# Patient Record
Sex: FEMALE | Race: WHITE | NOT HISPANIC OR LATINO | Employment: FULL TIME | URBAN - METROPOLITAN AREA
[De-identification: names, ages, dates, MRNs, and addresses within clinical notes are randomized per-mention and may not be internally consistent; named-entity substitution may affect disease eponyms.]

---

## 2017-02-26 ENCOUNTER — COMMUNICATION - HEALTHEAST (OUTPATIENT)
Dept: FAMILY MEDICINE | Facility: CLINIC | Age: 22
End: 2017-02-26

## 2017-02-26 DIAGNOSIS — Z30.09 BIRTH CONTROL COUNSELING: ICD-10-CM

## 2017-02-28 ENCOUNTER — OFFICE VISIT - HEALTHEAST (OUTPATIENT)
Dept: FAMILY MEDICINE | Facility: CLINIC | Age: 22
End: 2017-02-28

## 2017-02-28 DIAGNOSIS — Z30.09 BIRTH CONTROL COUNSELING: ICD-10-CM

## 2017-02-28 DIAGNOSIS — R10.33 PERIUMBILICAL PAIN: ICD-10-CM

## 2017-05-16 ENCOUNTER — AMBULATORY - HEALTHEAST (OUTPATIENT)
Dept: NURSING | Facility: CLINIC | Age: 22
End: 2017-05-16

## 2017-05-16 DIAGNOSIS — Z23 NEED FOR HEPATITIS B VACCINATION: ICD-10-CM

## 2017-05-19 ENCOUNTER — RECORDS - HEALTHEAST (OUTPATIENT)
Dept: ADMINISTRATIVE | Facility: OTHER | Age: 22
End: 2017-05-19

## 2017-10-02 ENCOUNTER — COMMUNICATION - HEALTHEAST (OUTPATIENT)
Dept: FAMILY MEDICINE | Facility: CLINIC | Age: 22
End: 2017-10-02

## 2017-11-17 ENCOUNTER — COMMUNICATION - HEALTHEAST (OUTPATIENT)
Dept: FAMILY MEDICINE | Facility: CLINIC | Age: 22
End: 2017-11-17

## 2017-11-17 DIAGNOSIS — Z30.09 BIRTH CONTROL COUNSELING: ICD-10-CM

## 2018-01-03 ENCOUNTER — OFFICE VISIT - HEALTHEAST (OUTPATIENT)
Dept: FAMILY MEDICINE | Facility: CLINIC | Age: 23
End: 2018-01-03

## 2018-01-03 DIAGNOSIS — B07.9 WARTS OF FOOT: ICD-10-CM

## 2018-01-03 DIAGNOSIS — Z30.9 CONTRACEPTION MANAGEMENT: ICD-10-CM

## 2018-07-03 ENCOUNTER — RECORDS - HEALTHEAST (OUTPATIENT)
Dept: ADMINISTRATIVE | Facility: OTHER | Age: 23
End: 2018-07-03

## 2018-11-22 ENCOUNTER — COMMUNICATION - HEALTHEAST (OUTPATIENT)
Dept: FAMILY MEDICINE | Facility: CLINIC | Age: 23
End: 2018-11-22

## 2018-11-22 DIAGNOSIS — Z30.9 CONTRACEPTION MANAGEMENT: ICD-10-CM

## 2019-02-08 ENCOUNTER — COMMUNICATION - HEALTHEAST (OUTPATIENT)
Dept: FAMILY MEDICINE | Facility: CLINIC | Age: 24
End: 2019-02-08

## 2019-02-08 DIAGNOSIS — Z30.9 CONTRACEPTION MANAGEMENT: ICD-10-CM

## 2019-02-25 ENCOUNTER — OFFICE VISIT - HEALTHEAST (OUTPATIENT)
Dept: FAMILY MEDICINE | Facility: CLINIC | Age: 24
End: 2019-02-25

## 2019-02-25 DIAGNOSIS — Z23 IMMUNIZATION DUE: ICD-10-CM

## 2019-02-25 DIAGNOSIS — Z30.9 CONTRACEPTION MANAGEMENT: ICD-10-CM

## 2019-03-26 ENCOUNTER — OFFICE VISIT - HEALTHEAST (OUTPATIENT)
Dept: FAMILY MEDICINE | Facility: CLINIC | Age: 24
End: 2019-03-26

## 2019-03-26 DIAGNOSIS — R68.82 DECREASED LIBIDO: ICD-10-CM

## 2019-03-26 DIAGNOSIS — Z30.40 ENCOUNTER FOR SURVEILLANCE OF CONTRACEPTIVES, UNSPECIFIED CONTRACEPTIVE: ICD-10-CM

## 2019-03-26 DIAGNOSIS — Z23 IMMUNIZATION DUE: ICD-10-CM

## 2019-03-26 DIAGNOSIS — Z12.4 CERVICAL CANCER SCREENING: ICD-10-CM

## 2019-03-26 DIAGNOSIS — Z00.00 ROUTINE GENERAL MEDICAL EXAMINATION AT A HEALTH CARE FACILITY: ICD-10-CM

## 2019-03-26 ASSESSMENT — MIFFLIN-ST. JEOR: SCORE: 1492.02

## 2019-03-27 LAB
BKR LAB AP ABNORMAL BLEEDING: NO
BKR LAB AP BIRTH CONTROL/HORMONES: NORMAL
BKR LAB AP CERVICAL APPEARANCE: NORMAL
BKR LAB AP GYN ADEQUACY: NORMAL
BKR LAB AP GYN INTERPRETATION: NORMAL
BKR LAB AP HPV REFLEX: NORMAL
BKR LAB AP LMP: NORMAL
BKR LAB AP PATIENT STATUS: NORMAL
BKR LAB AP PREVIOUS ABNORMAL: NORMAL
BKR LAB AP PREVIOUS NORMAL: 2015
HIGH RISK?: NO
PATH REPORT.COMMENTS IMP SPEC: NORMAL
RESULT FLAG (HE HISTORICAL CONVERSION): NORMAL

## 2019-05-28 ENCOUNTER — AMBULATORY - HEALTHEAST (OUTPATIENT)
Dept: NURSING | Facility: CLINIC | Age: 24
End: 2019-05-28

## 2019-05-28 DIAGNOSIS — Z23 IMMUNIZATION DUE: ICD-10-CM

## 2019-09-24 ENCOUNTER — COMMUNICATION - HEALTHEAST (OUTPATIENT)
Dept: FAMILY MEDICINE | Facility: CLINIC | Age: 24
End: 2019-09-24

## 2019-09-24 DIAGNOSIS — Z30.09 COUNSELING FOR BIRTH CONTROL, INTRAUTERINE DEVICE: ICD-10-CM

## 2019-10-01 ENCOUNTER — AMBULATORY - HEALTHEAST (OUTPATIENT)
Dept: NURSING | Facility: CLINIC | Age: 24
End: 2019-10-01

## 2019-10-01 DIAGNOSIS — Z23 IMMUNIZATION DUE: ICD-10-CM

## 2019-10-18 ENCOUNTER — OFFICE VISIT - HEALTHEAST (OUTPATIENT)
Dept: FAMILY MEDICINE | Facility: CLINIC | Age: 24
End: 2019-10-18

## 2019-10-18 DIAGNOSIS — Z30.430 ENCOUNTER FOR IUD INSERTION: ICD-10-CM

## 2019-10-18 LAB — HCG UR QL: NEGATIVE

## 2021-04-16 ENCOUNTER — OFFICE VISIT - HEALTHEAST (OUTPATIENT)
Dept: FAMILY MEDICINE | Facility: CLINIC | Age: 26
End: 2021-04-16

## 2021-04-16 ENCOUNTER — COMMUNICATION - HEALTHEAST (OUTPATIENT)
Dept: FAMILY MEDICINE | Facility: CLINIC | Age: 26
End: 2021-04-16

## 2021-04-16 DIAGNOSIS — Z30.432 ENCOUNTER FOR IUD REMOVAL: ICD-10-CM

## 2021-05-19 ENCOUNTER — OFFICE VISIT - HEALTHEAST (OUTPATIENT)
Dept: FAMILY MEDICINE | Facility: CLINIC | Age: 26
End: 2021-05-19

## 2021-05-19 DIAGNOSIS — Z34.01 NORMAL FIRST PREGNANCY CONFIRMED, CURRENTLY IN FIRST TRIMESTER: ICD-10-CM

## 2021-05-19 LAB — HCG UR QL: POSITIVE

## 2021-05-19 RX ORDER — PRENATAL VIT/IRON FUM/FOLIC AC 27MG-0.8MG
1 TABLET ORAL DAILY
Qty: 90 TABLET | Refills: 3 | Status: SHIPPED
Start: 2021-05-19

## 2021-05-19 ASSESSMENT — MIFFLIN-ST. JEOR: SCORE: 1486.13

## 2021-06-08 ENCOUNTER — HOSPITAL ENCOUNTER (OUTPATIENT)
Dept: ULTRASOUND IMAGING | Facility: HOSPITAL | Age: 26
Discharge: HOME | End: 2021-06-08
Attending: FAMILY MEDICINE

## 2021-06-13 ENCOUNTER — HEALTH MAINTENANCE LETTER (OUTPATIENT)
Age: 26
End: 2021-06-13

## 2021-07-01 ENCOUNTER — COMMUNICATION - HEALTHEAST (OUTPATIENT)
Dept: SCHEDULING | Facility: CLINIC | Age: 26
End: 2021-07-01

## 2021-07-02 ENCOUNTER — OFFICE VISIT - HEALTHEAST (OUTPATIENT)
Dept: FAMILY MEDICINE | Facility: CLINIC | Age: 26
End: 2021-07-02

## 2021-07-02 ENCOUNTER — HOSPITAL ENCOUNTER (OUTPATIENT)
Dept: ULTRASOUND IMAGING | Facility: CLINIC | Age: 26
Discharge: HOME | End: 2021-07-02
Attending: FAMILY MEDICINE
Payer: COMMERCIAL

## 2021-07-02 DIAGNOSIS — Z3A.10 10 WEEKS GESTATION OF PREGNANCY: ICD-10-CM

## 2021-07-02 DIAGNOSIS — O20.0 THREATENED ABORTION IN FIRST TRIMESTER: ICD-10-CM

## 2021-07-02 LAB — HGB BLD-MCNC: 14 G/DL (ref 12–16)

## 2021-07-02 ASSESSMENT — MIFFLIN-ST. JEOR: SCORE: 1504.72

## 2021-07-03 LAB
ABO/RH(D): NORMAL
ABORH REPEAT: NORMAL

## 2021-07-05 ENCOUNTER — DOCUMENTATION ONLY (OUTPATIENT)
Dept: ADMINISTRATIVE | Facility: OTHER | Age: 26
End: 2021-07-05

## 2021-07-05 VITALS — HEIGHT: 69 IN

## 2021-07-06 ENCOUNTER — TRANSFERRED RECORDS (OUTPATIENT)
Dept: HEALTH INFORMATION MANAGEMENT | Facility: CLINIC | Age: 26
End: 2021-07-06

## 2021-07-06 VITALS
HEART RATE: 68 BPM | BODY MASS INDEX: 23.03 KG/M2 | SYSTOLIC BLOOD PRESSURE: 135 MMHG | DIASTOLIC BLOOD PRESSURE: 64 MMHG | WEIGHT: 155.5 LBS | HEIGHT: 69 IN

## 2021-07-09 NOTE — PROGRESS NOTES
This encounter was created as part of manual pregnancy episode data conversion for the single EHR project. The following information (where applicable) was manually abstracted from the [employer] Epic instance on July 9, 2021: pregnancy episode name/date, dating, episode encounter linking, pregravid weight, number of fetuses, and pregnancy overview and plan.     Haleigh Tucker RN   Clinical Informatics

## 2021-07-14 ENCOUNTER — MYC MEDICAL ADVICE (OUTPATIENT)
Dept: FAMILY MEDICINE | Facility: CLINIC | Age: 26
End: 2021-07-14

## 2021-07-15 ENCOUNTER — PRENATAL OFFICE VISIT (OUTPATIENT)
Dept: FAMILY MEDICINE | Facility: CLINIC | Age: 26
End: 2021-07-15
Payer: COMMERCIAL

## 2021-07-15 VITALS
OXYGEN SATURATION: 100 % | HEIGHT: 69 IN | SYSTOLIC BLOOD PRESSURE: 137 MMHG | BODY MASS INDEX: 22.42 KG/M2 | DIASTOLIC BLOOD PRESSURE: 74 MMHG | HEART RATE: 80 BPM | WEIGHT: 151.4 LBS

## 2021-07-15 VITALS
BODY MASS INDEX: 22.61 KG/M2 | SYSTOLIC BLOOD PRESSURE: 110 MMHG | HEART RATE: 83 BPM | DIASTOLIC BLOOD PRESSURE: 70 MMHG | WEIGHT: 152.25 LBS | RESPIRATION RATE: 16 BRPM | OXYGEN SATURATION: 98 %

## 2021-07-15 VITALS
HEART RATE: 75 BPM | BODY MASS INDEX: 22.96 KG/M2 | SYSTOLIC BLOOD PRESSURE: 113 MMHG | TEMPERATURE: 98 F | WEIGHT: 155 LBS | HEIGHT: 69 IN | DIASTOLIC BLOOD PRESSURE: 58 MMHG

## 2021-07-15 VITALS
SYSTOLIC BLOOD PRESSURE: 124 MMHG | DIASTOLIC BLOOD PRESSURE: 58 MMHG | BODY MASS INDEX: 22.13 KG/M2 | OXYGEN SATURATION: 99 % | WEIGHT: 149 LBS | HEART RATE: 78 BPM

## 2021-07-15 VITALS — WEIGHT: 157.4 LBS | HEIGHT: 69 IN | BODY MASS INDEX: 22.72 KG/M2 | BODY MASS INDEX: 23.41 KG/M2 | WEIGHT: 153.4 LBS

## 2021-07-15 VITALS — BODY MASS INDEX: 22.85 KG/M2 | WEIGHT: 153.6 LBS

## 2021-07-15 VITALS — BODY MASS INDEX: 22.61 KG/M2 | WEIGHT: 152 LBS

## 2021-07-15 DIAGNOSIS — O20.9 VAGINAL BLEEDING IN PREGNANCY, FIRST TRIMESTER: Primary | ICD-10-CM

## 2021-07-15 LAB
CLUE CELLS: ABNORMAL
TRICHOMONAS, WET PREP: ABNORMAL
WBC'S/HIGH POWER FIELD, WET PREP: ABNORMAL
YEAST, WET PREP: ABNORMAL

## 2021-07-15 PROCEDURE — 87210 SMEAR WET MOUNT SALINE/INK: CPT | Performed by: FAMILY MEDICINE

## 2021-07-15 PROCEDURE — 99207 PR PRENATAL VISIT: CPT | Performed by: FAMILY MEDICINE

## 2021-07-15 ASSESSMENT — MIFFLIN-ST. JEOR: SCORE: 1502.46

## 2021-07-15 NOTE — PATIENT INSTRUCTIONS - HE
Phone Numbers:  [facility]: [phone]  [facility]: [phone]    Tips to Relieve Nausea  Although nausea can occur at any time of the day, it may be worse in the morning. To help prevent nausea:    Eat small, light meals at frequent intervals.    Get up slowly. Eat a few unsalted crackers before you get out of bed.    Drink water or 7-Up to soothe your stomach.    Eat an ice pop in your favorite flavor.    Ask your health care provider about taking lolita or vitamin B6 for nausea and vomiting.    Talk with your health care provider if you take vitamins that upset your stomach.  Safe Medications    Take one prenatal vitamin with at least 400 mcg of folic acid daily.    Use acetaminophen (Tylenol) for pain.     Try Maalox or Tums for heartburn. If these are not working, talk to your doctor about trying a different medication.    Diphenhydramine (Benadryl) can also be used safely in pregnancy.    Talk to your doctor about any other medications or vitamins you are taking!  Start Healthy Habits Now  What matters most is protecting your baby from this moment on. If you smoke, drink alcohol, or use drugs, now is the time to stop. If you need help, talk with your health care provider.    Smoking increases the risk of miscarriage or having a low-birth-weight baby. If you smoke, quit now.    Alcohol and drugs have been linked with miscarriage, birth defects, intellectual disability, and low birth weight. Do not drink alcohol or take drugs.    Continue your current exercise routine, or start one with walking, swimming, and other low impact exercises. Yoga specifically designed for pregnant moms is a great stress and pain reliever. Keep your self well hydrated and avoid overheating with all activity. If bleeding, fluid leakage, or cramping/contractions occur, stop the exercise and call your doctor.     Wear your seatbelt every time you are in the car. Fasten the lap part underneath your growing belly and the shoulder  part as you normally would.   Weight Gain    Add an additional 300 to 400 calories a day into your diet.    Ideal weight gain is 25 to 35 pounds.    If your BMI is over 30, your ideal weight gain is 15 pounds.    If your BMI is under 20, your ideal weight gain is 40 pounds.    Talk to your doctor if you are concerned about weight gain.   Keep Your Environment Save  You can still clean house and use scented products. Just take some simple precautions:    Wear gloves when using cleaning fluids.    Open windows to let in fresh air. Use a fan if you paint.    Avoid secondhand smoke.    Don t breathe fumes from nail polish, hair spray, cleansers, or other chemicals.  Work Concerns  The end of the first trimester is a good time to discuss working during pregnancy with your employer. Follow your health care provider s advice if your job requires you to stand for a long time, work with hazardous tools, or even sit at a desk all day. Your workspace, workload, or scheduled hours may need to be adjusted - perhaps you can change body postures more often or take an extra break.  Intimacy  Unless your health care provider tells you to, there is no reason to stop having sex while you re pregnant. You or your partner may notice changes in desire. Desire may be less in the first trimester, due to nausea and fatigue. In the second trimester, sex may be very enjoyable. The third trimester can be a challenge comfort-wise. Try different positions and see what s best for you both. As always, let your doctor know if you experience any bleeding, leakage of fluids, or cramping/contractions.  Other Pregnancy Concerns    Limit the amount of radiation you are exposed to. Always tell the radiology technologist that you are pregnant if having an xray or CT scan done.     Practice good hand washing to prevent infection.    Avoid cat litter.     Wash all fruits and vegetabes. Always cook meat thoroughly and do not eat raw fish. Do not eat more  than 6 to 12 ounces of other fish sources.     Do not eat soft cheeses.    Limit caffeine to less than 300 milligrams a days. The average cup of coffee as approximately 120 milligrams of caffeine.

## 2021-07-15 NOTE — TELEPHONE ENCOUNTER
Left message to call back for: patient  Information to relay to patient:  See note below.   H.DeGree, CMA

## 2021-07-15 NOTE — PROGRESS NOTES
Assessment:         1. Routine general medical examination at a health care facility    2. Decreased libido    3. Encounter for surveillance of contraceptives, unspecified contraceptive    4. Cervical cancer screening    5. Immunization due        Plan:      1. Routine general medical examination at a health care facility  Routine health maintenance discussion:  No smoking, limited alcohol (7 or less servings per week), 5 fruits/veg servings per day, 200 minutes of exercise per week.  Daily calcium/vitamin D guidelines, bone health, colon cancer screening beginning at age 50.  Accident avoidance, sun screen.     2. Decreased libido  -Discussed the birth control as a possible etiology of her decreased libido.  Recommended considering couples therapy as well.  In the meantime I am going to change her to a lower anti-androgen progesterone to see if this helps.  She will let me know in the next 2-3 months how she is doing, sooner if she is not doing well.    3. Encounter for surveillance of contraceptives, unspecified contraceptive  -As stated above, will trial Kariva instead  - desog-e.estradiol/e.estradiol (KARIVA) 0.15-0.02 mgx21 /0.01 mg x 5 per tablet; Take 1 tablet by mouth daily.  Dispense: 84 tablet; Refill: 3    4. Cervical cancer screening  - Gynecologic Cytology (PAP Smear)    5. Immunization due  - HPV vaccine quadrivalent 3 dose IM; Standing  - HPV vaccine 9 valent 3 dose IM       Subjective:      Melissa Claire is a 24 y.o. female who presents for an annual exam. The patient is sexually active. The patient participates in regular exercise: yes. The patient reports that there is not domestic violence in her life.     She is doing well. Has no complaints. Is due for a pap smear today. She does have a poor sex drive,has been since the birth control, no change since the change. Did this last year.     Healthy Habits:   Regular Exercise: Yes  Sunscreen Use: Yes  Healthy Diet: Yes  Dental Visits Regularly:  Yes  Seat Belt: Yes  Sexually active: Yes  Self Breast Exam Monthly:No  Hemoccults: N/A  Flex Sig: N/A  Colonoscopy: N/A  Lipid Profile: No  Glucose Screen: No  Prevention of Osteoporosis: No  Last Dexa: N/A  Guns at Home:  No      Immunization History   Administered Date(s) Administered     HPV 9 Valent 03/26/2019     Hep B, Adult 11/22/2016, 12/20/2016, 05/16/2017     Influenza, inj, historic,unspecified 09/24/2015     Tdap 07/20/2006, 02/25/2019     Immunization status: up to date and documented, missing doses of HPV & flu.    No exam data present    Gynecologic History  Patient's last menstrual period was 03/07/2019 (approximate).  Contraception: OCP (estrogen/progesterone)  Last Pap: 11/24/15. Results were: normal  Last mammogram: N/A. Results were: N/A      OB History   No data available       Current Outpatient Medications   Medication Sig Dispense Refill     norgestrel-ethinyl estradiol (LOW-OGESTREL, 28,) 0.3-30 mg-mcg per tablet Take 1 tablet by mouth daily. 84 tablet 3     sulfacetamide sodium 10 % Susp Apply topically 2 (two) times a day.       desog-e.estradiol/e.estradiol (KARIVA) 0.15-0.02 mgx21 /0.01 mg x 5 per tablet Take 1 tablet by mouth daily. 84 tablet 3     No current facility-administered medications for this visit.      History reviewed. No pertinent past medical history.  History reviewed. No pertinent surgical history.  Patient has no known allergies.  Family History   Problem Relation Age of Onset     No Medical Problems Mother      No Medical Problems Father      Social History     Socioeconomic History     Marital status: Single     Spouse name: Not on file     Number of children: Not on file     Years of education: Not on file     Highest education level: Not on file   Occupational History     Not on file   Social Needs     Financial resource strain: Not on file     Food insecurity:     Worry: Not on file     Inability: Not on file     Transportation needs:     Medical: Not on file      "Non-medical: Not on file   Tobacco Use     Smoking status: Never Smoker     Smokeless tobacco: Never Used   Substance and Sexual Activity     Alcohol use: No     Alcohol/week: 0.0 oz     Frequency: Never     Drug use: No     Sexual activity: Yes     Partners: Male     Birth control/protection: Pill   Lifestyle     Physical activity:     Days per week: Not on file     Minutes per session: Not on file     Stress: Not on file   Relationships     Social connections:     Talks on phone: Not on file     Gets together: Not on file     Attends Taoism service: Not on file     Active member of club or organization: Not on file     Attends meetings of clubs or organizations: Not on file     Relationship status: Not on file     Intimate partner violence:     Fear of current or ex partner: Not on file     Emotionally abused: Not on file     Physically abused: Not on file     Forced sexual activity: Not on file   Other Topics Concern     Not on file   Social History Narrative     Not on file       Review of Systems  Review of Systems     With the exception of the aforementioned issues, 12 point, comprehensive ROS was done and was negative.       Objective:         Vitals:    03/26/19 1215   BP: 118/62   Pulse: 90   Temp: 97.6  F (36.4  C)   TempSrc: Oral   SpO2: 99%   Weight: 153 lb 6.4 oz (69.6 kg)   Height: 5' 8.8\" (1.748 m)     Body mass index is 22.79 kg/m .    Physical  Physical Exam     Gen: Well developed, well nourished, no acute distress.  HEENT: normocephalic/atraumatic, PERRLA/EOMI, TMs: Gray, normal light reflex, no nasal discharge.  Oral mucosa: no erythema/exudate  Neck: No LAD/masses/thyromegaly  Lungs: clear bilaterally  Heart: regular rate and rhythm, no murmurs/gallops/rubs  Breasts: symmetric, no masses/skin changes, nipple discharge, or axillary LAD.  BSE reviewed.  Abdomen: Normal bowel sounds, soft, non-tender, non-distended, no masses, neg Edmondson's/McBurney's, no rebound/guarding  Genital: Normal " external genitalia, no discharge, no lesions, cervix is non-friable, os is closed, no CMT, no adnexal tenderness or fullness.  Uterus is not enlarged, perineum intact.  Thin prep done.   Lymphatics: no supraclavicular/axillary/epitrochlear/inguinal LAD. No edema.  Neuro: A&O x 3, CN II-XII intact, strength 5/5, reflexes symmetric, sensory intact to light touch.  Psych: Behavior appropriate, engaging.  Thought processes congruent, non-tangential.  Musculoskeletal: no gross deformities.  Skin: no rashes or lesions.

## 2021-07-15 NOTE — PROGRESS NOTES
Progress Notes by Liliana Oswald DO at 7/2/2021  5:00 PM     Author: Liliana Oswald DO Service: Family Medicine Author Type: Physician    Filed: 7/2/2021  5:19 PM Date of Service: 7/2/2021  5:00 PM Status: Signed    : Liliana Oswald DO (Physician)       Viable intrauterine pregnancy, growth appropriate since last ultrasound.  Small subchorionic hemorrhage present, likely underlying etiology for spotting and symptoms.  Patient updated by [employer] message, also left a voicemail message.  She should follow-up with Dr. Beckham as scheduled, may reach out to the on-call [facility] OB over the weekend if any concerns arise.  Liliana Oswald DO

## 2021-07-15 NOTE — PROGRESS NOTES
"Cryotherapy, skin lesion  Date/Time: 1/3/2018 8:10 AM  Performed by: SABINE BONNER  Authorized by: SABINE BONNER   Consent: Verbal consent obtained.  Risks and benefits: risks, benefits and alternatives were discussed  Consent given by: patient  Patient understanding: patient states understanding of the procedure being performed  Patient consent: the patient's understanding of the procedure matches consent given  Procedure consent: procedure consent matches procedure scheduled  Site marked: the operative site was marked  Required items: required blood products, implants, devices, and special equipment available  Patient identity confirmed: verbally with patient  Time out: Immediately prior to procedure a \"time out\" was called to verify the correct patient, procedure, equipment, support staff and site/side marked as required.  Preparation: Patient was prepped and draped in the usual sterile fashion.  Local anesthesia used: no    Anesthesia:  Local anesthesia used: no    Sedation:  Patient sedated: no  Patient tolerance: Patient tolerated the procedure well with no immediate complications        Procedure note:    Consent: Risks and benefits of therapy discussed with patient who voices understanding and agrees with planned care. No barriers to communication or understanding identified.  After obtaining informed consent, the patient's identity, procedure, and site were verified during a pause prior to proceeding with the minor surgical procedure as per universal protocol recommendations.  1 Wart was treated with cryocautery with freeze thaw freeze technique with 2-3 mm surround freeze. Local care discussed. Side effects of treatment and precautions discussed. All questions answered. To follow up if worse or any new problems      "

## 2021-07-15 NOTE — TELEPHONE ENCOUNTER
Prescription for birth control sent to the pharmacy, she is due for a visit prior to further refills.

## 2021-07-15 NOTE — PROGRESS NOTES
Progress Notes by Liliana Oswald DO at 2021  3:00 PM     Author: Liliana Oswald DO Service: -- Author Type: Physician    Filed: 2021 11:09 AM Encounter Date: 2021 Status: Signed    : Liliana Oswald DO (Physician)       Threatened . Normal hemoglobin. A positive blood type. Rhogam not indicated. US reviewed with patient and viable IUP present.  Liliana Oswald DO

## 2021-07-15 NOTE — TELEPHONE ENCOUNTER
RN cannot approve Refill Request    RN can NOT refill this medication PCP messaged that patient is overdue for Office Visit and Physical .    Nel Bland, [facility] Triage/Med Refill 2/11/2019    Requested Prescriptions   Pending Prescriptions Disp Refills     LOW-OGESTREL, 28, 0.3-30 mg-mcg per tablet [Pharmacy Med Name: LOW-OGESTREL TABLETS 28] 84 tablet 0     Sig: TAKE 1 TABLET BY MOUTH DAILY    Oral Contraceptives Protocol Failed - 2/8/2019  7:44 PM       Failed - Visit with PCP or prescribing provider visit in last 12 months     Last office visit with prescriber/PCP: 1/3/2018 Ofelia Angulo MD OR same dept: Visit date not found OR same specialty: 1/3/2018 Ofelia Angulo MD  Last physical: 11/24/2015 Last MTM visit: Visit date not found   Next visit within 3 mo: Visit date not found  Next physical within 3 mo: Visit date not found  Prescriber OR PCP: Ofelia Angulo MD  Last diagnosis associated with med order: 1. Contraception management  - LOW-OGESTREL, 28, 0.3-30 mg-mcg per tablet [Pharmacy Med Name: LOW-OGESTREL TABLETS 28]; TAKE 1 TABLET BY MOUTH DAILY  Dispense: 84 tablet; Refill: 0    If protocol passes may refill for 12 months if within 3 months of last provider visit (or a total of 15 months).

## 2021-07-15 NOTE — PROGRESS NOTES
Progress Notes by Liliana Oswald DO at 2021  3:00 PM     Author: Liliana Oswald DO Service: -- Author Type: Physician    Filed: 2021  3:58 PM Encounter Date: 2021 Status: Signed    : Liliana Oswald DO (Physician)       Name: Melissa Rosario  : 1995   MRN: [id number]    ASSESSMENT & PLAN:   Melissa Rosario is a 26 y.o. female presenting today for evaluation of cramping and spotting in first trimester.     1. Threatened  in first trimester  2. 10 weeks gestation of pregnancy  - Hemoglobin  - Outpatient  Blood Typing (HML ABO/Rh Typing)  - OB US limited    Cardiac activity present, reassuring.  US confirms small subchorionic hemorrhage.  A pos, doesn't need rhogam. Normal hemoglobin.  Recommend pelvic rest, avoiding strenuous activities, and follow up with [facility] as scheduled.  If symptoms are worsening, call into clinic for on call OB, further evaluation.    Return in about 4 days (around 2021) for as scheduled to see Dr. Beckham.    Liliana Oswald DO  [facility]          Melissa Rosario is a 26 y.o.  at approximately 10 weeks gestation presenting to discuss the following:     CC:   Chief Complaint   Patient presents with   ? Abdominal Cramping     started on 21.   ? Vaginal Bleeding     21       HPI:  Started having some cramping last evening, more intense yesterday evening, improved a little today but still present. Light spotting yesterday.No leaking fluids. First pregnancy. Has completed dating US. Doesn't know blood type.    ROS:   See HPI above.     PMH:   Patient Active Problem List   Diagnosis   ? Patellofemoral Syndrome   ? Low back pain   ? Dermatitis   ? Acne   ? Normal first pregnancy confirmed, currently in first trimester       No past medical history on file.    PSH:   No past surgical history on file.      MEDICATIONS:   Current Outpatient Medications on File Prior to Visit   Medication Sig Dispense Refill   ? prenatal vit  "no.130-iron-folic (PRENATAL VITAMIN) 27 mg iron- 800 mcg Tab tablet Take 1 tablet by mouth daily. 90 tablet 3     No current facility-administered medications on file prior to visit.        ALLERGIES:  No Known Allergies    FAMHx:  Family History   Problem Relation Age of Onset   ? No Medical Problems Mother    ? Skin cancer Father        SOCIAL HISTORY:   Social History     Tobacco Use   ? Smoking status: Never Smoker   ? Smokeless tobacco: Never Used   Substance Use Topics   ? Alcohol use: No     Alcohol/week: 0.0 standard drinks     Frequency: Never   ? Drug use: No       PHYSICAL EXAM:   /64   Pulse 68   Ht 5' 9\" (1.753 m)   Wt 155 lb 8 oz (70.5 kg)   LMP  (LMP Unknown)   Breastfeeding No   BMI 22.96 kg/m     GENERAL: Melissa is a pleasant, non-toxic appearing female, in no acute distress.   HEART: Regular rate and rhythm, no murmurs.   LUNGS: Clear to auscultation bilaterally, unlabored.   ABDOMEN: Soft, unable to palpate uterus. Fetal cardiac activity present on beside US.      LABS:   Recent Results (from the past 240 hour(s))   Hemoglobin   Result Value Ref Range    Hemoglobin 14.0 12.0 - 16.0 g/dL   Outpatient  Blood Typing (HML ABO/Rh Typing)   Result Value Ref Range    HML ABO/Rh Typing A POS     HML ABO/Rh Repeat Typing A POS      IMAGING:  LOCATION: [facility]  DATE/TIME: 2021 4:56 PM     INDICATION: threatened , 10 weeks pregnant with cramping and spotting onset , cardiac activity present in clinic   COMPARISON: 2021  TECHNIQUE: Transabdominal scans were performed.     FINDINGS:  UTERUS: Single normal appearing intrauterine gestation sac.  CRL: Measures 3.4 cm, equals 10 weeks 3 days.  FETAL HEART RATE: 167 bpm.  AMNIOTIC FLUID: Normal.  PLACENTA: Small subchorionic 2.2 x 1.1 x 1 cm hemorrhage.     RIGHT OVARY: Normal.  LEFT OVARY: Normal.     IMPRESSION:   1.  Single living intrauterine gestation at 10 weeks 3 days. This agrees with the " first ultrasound.  2.  EDC 01/28/2022 by first ultrasound.

## 2021-07-15 NOTE — PROGRESS NOTES
ASSESSMENT/PLAN:       1. Contraception management  -Rate control is working well at this time without complaints.  Renewing today, and she will plan on following up in 1 year.  She will call sooner if she has questions or issues.  - norgestrel-ethinyl estradiol (LOW-OGESTREL, 28,) 0.3-30 mg-mcg per tablet; Take 1 tablet by mouth daily.  Dispense: 84 tablet; Refill: 3    2. Immunization due  -Updated Tdap today, as the patient had not eaten she defer the HPV vaccine but will follow-up at her convenience to start the series.  - HPV vaccine 9 valent 3 dose IM; Standing  - Tdap vaccine,  6yo or older,  IM      Return for Next scheduled follow up.  The patient is due for a Pap smear, will return for her physical in approximately 1 month.      Ofelia Angulo MD      PROGRESS NOTE   2/25/2019    SUBJECTIVE:  Melissa Claire is a 23 y.o. female  who presents for birth control follow up.     She is doing well with the birth control. She doesn't have any side effects. Acne is stable. She is using prescription strength face wash and that is helping. Sh edid stop using that for a while when it was cold as it does dry her skin out and she noted that her skin was quite a bit worse. She does feel that this is better with the medication.       Chief Complaint   Patient presents with     Contraception     Patient is here today to follow up on her birth control.          Patient Active Problem List   Diagnosis     Patellofemoral Syndrome     Low back pain     Dermatitis     Acne       Current Outpatient Medications   Medication Sig Dispense Refill     sulfacetamide sodium 10 % Susp Apply topically 2 (two) times a day.       norgestrel-ethinyl estradiol (LOW-OGESTREL, 28,) 0.3-30 mg-mcg per tablet Take 1 tablet by mouth daily. 84 tablet 3     No current facility-administered medications for this visit.        Social History     Tobacco Use   Smoking Status Never Smoker   Smokeless Tobacco Never Used           OBJECTIVE:         No results found for this or any previous visit (from the past 240 hour(s)).    Vitals:    02/25/19 0804   BP: 110/68   Patient Site: Left Arm   Patient Position: Sitting   Cuff Size: Adult Regular   Pulse: 66   SpO2: 98%   Weight: 157 lb 6.4 oz (71.4 kg)     Weight: 157 lb 6.4 oz (71.4 kg)          Physical Exam:  GENERAL APPEARANCE: A&A, NAD, well hydrated, well nourished  SKIN:  Normal skin turgor, no lesions/rashes   HEENT: moist mucous membranes, no rhinorrhea  NECK: Normal  CV: RRR, no M/G/R   LUNGS: CTAB  NEURO: no gross deficits

## 2021-07-15 NOTE — PROGRESS NOTES
"Removal of Intrauterine Device    Date/Time: 4/16/2021 7:35 AM  Performed by: Ofelia Angulo MD  Authorized by: Ofelia Angulo MD   Consent: Verbal consent obtained.  Risks and benefits: risks, benefits and alternatives were discussed  Consent given by: patient  Patient understanding: patient states understanding of the procedure being performed  Patient consent: the patient's understanding of the procedure matches consent given  Procedure consent: procedure consent matches procedure scheduled  Relevant documents: relevant documents present and verified  Site marked: the operative site was not marked  Required items: required blood products, implants, devices, and special equipment available  Patient identity confirmed: verbally with patient  Time out: Immediately prior to procedure a \"time out\" was called to verify the correct patient, procedure, equipment, support staff and site/side marked as required.  Preparation: Patient was prepped and draped in the usual sterile fashion.  Local anesthesia used: no    Anesthesia:  Local anesthesia used: no    Sedation:  Patient sedated: no    Patient tolerance: Patient tolerated the procedure well with no immediate complications          IUD Removal Procedure Note    This 26 y.o. female presents today for removal of her IUD, which was inserted 1.5 ago. Melissa reports she is  interested in persuing a pregnancy in the near future    Procedure Details   Urine pregnancy test was not done. I discussed with the patient the potential risks and benefits of IUD removal. The alternatives forms of birth control were discussed with the patient.  The patient understands the procedure, has had ample time to ask questions Verbal informed consent was obtained.      Patient was positioned and the IUD strings were visualized.  The strings were grasped with forceps and the IUD was gently removed.    Condition:  Stable    Complications:  None. Patient tolerated " procedure well.    Plan:  The patient was advised to call for any fever or for prolonged or severe pain or bleeding.   We discussed options for prenatal care when she gets to that point, several names given for my partners up in the [facility] nearer where she lives now  She will start a prenatal vitamin when not actively preventing pregnancy. Discussed immediate return to fertility.

## 2021-07-15 NOTE — PROGRESS NOTES
ASSESSMENT/PLAN:       1. Contraception management  -She did maybe have worsening of acne with AVN, and does feel like her sex drive is worse with the TriNessa.  I will have her try the medication listed below and we did talk about risks and benefits of a Nexplanon or an IUD.  She would be interested in something like the Kyleena or the Mirena and she will call her insurance company to verify coverage.  We discussed that if she would like to have an IUD placed she should call us that we can pretreat her with Cytotec as well as ibuprofen.  If she is doing well on this current birth control pill she can follow-up in 1 year.  She will call if she has any other further questions.  - norgestrel-ethinyl estradiol (CRYSELLE, 28,) 0.3-30 mg-mcg per tablet; Take 1 tablet by mouth daily.  Dispense: 84 tablet; Refill: 3    2. Warts of foot  -Treated today with cryotherapy, follow-up as needed  - Cryotherapy, skin lesion    Ofelia Angulo MD      PROGRESS NOTE   1/3/2018    SUBJECTIVE:  Melissa Claire is a 22 y.o. female  who presents for follow up.     She is on birth control. She isn't taking the birth control at the same time anymore. She has noted a big change in her sex drive. She is wondering if this is causing issues with her sex drive. She is wondering if she should try something else or if she should try something like the IUD. She does think that her acne was slightly worse on the Aviane that she tried for a few months in 2014.     She does have a wart on the bottom of her right foot. Been there for years. She has put acid on it and covered with tape. It is not helping. She has seen dermatology multiple times for it and has had it frozen.  The one on her finger is gone, the one on her foot won't go away. She can't tell where it is anymore.   Chief Complaint   Patient presents with     Medication Management     Patient would like to review current medications.      Warts     Patient has had a wart on her  right foot, has been there for years.          Patient Active Problem List   Diagnosis     Patellofemoral Syndrome     Low back pain     Dermatitis     Acne       Current Outpatient Prescriptions   Medication Sig Dispense Refill     TRINESSA LO 0.18/0.215/0.25 mg-25 mcg Tab tablet TAKE 1 TABLET BY MOUTH EVERY DAY 84 tablet 0     norgestrel-ethinyl estradiol (CRYSELLE, Sarah,) 0.3-30 mg-mcg per tablet Take 1 tablet by mouth daily. 84 tablet 3     No current facility-administered medications for this visit.        History   Smoking Status     Never Smoker   Smokeless Tobacco     Never Used           OBJECTIVE:        No results found for this or any previous visit (from the past 240 hour(s)).    Vitals:    01/03/18 0749   BP: 122/58   Patient Site: Right Arm   Patient Position: Sitting   Cuff Size: Adult Regular   Pulse: 82   SpO2: 98%   Weight: 152 lb (68.9 kg)     Weight: 152 lb (68.9 kg)        Physical Exam:  GENERAL APPEARANCE: A&A, NAD, well hydrated, well nourished  SKIN:  Normal skin turgor, right lateral foot at the head of the fifth metatarsal shows a 1 cm diameter crusted area without any surrounding erythema  HEENT: moist mucous membranes, no rhinorrhea  NECK: Normal  CV: RRR, no M/G/R   LUNGS: CTAB  EXTREMITY: no edema   NEURO: no gross deficits

## 2021-07-15 NOTE — PROGRESS NOTES
SUBJECTIVE: Melissa Claire is a 21 y.o.  female who presents today with a complaint of stomach pain starting yesterday.  It seems to be centered around her umbilicus.  Most the time she just has a kind of discomfort but at times the pain is sharp.  She is noting that she has low back pain as well at times.  This morning it seemed better, but by noon it was bad again.  It gets worse with movement.  She denies any nausea or appetite changes.  She tells me that sometimes if she takes a very deep breath it bothers her stomach.  She denies any fever or abnormal stools.  She has had a BM within 24 hours.  She denies any chance for pregnancy.  She does not know a family history for appendicitis.  She is on oral birth control and tells me she is midcycle and we discussed the possibility of this being related to her menses.  The area that she has discomfort does not seem to indicate ovarian cyst.  She would like a refill of her birth control and tells me that they were not going to refill it for her.  She is up-to-date on her Pap.  She had it last year and it was normal.  She is due for a  chlamydia check and is willing to do that today.    OBJECTIVE:   Visit Vitals     /60     Pulse 66     Wt 153 lb 9.6 oz (69.7 kg)     BMI 22.85 kg/m2     General: Healthy and fit appearing young female in no acute distress currently  Heart: Regular rate and rhythm without murmur  Lungs: Clear bilaterally  Abdomen: Soft, nontender, normal bowel sounds  Extremities: Warm, dry and without edema    ASSESSMENT & PLAN:    1. Periumbilical pain     2. Birth control counseling  norgestimate-ethinyl estradiol (ORTHO TRI-CYCLEN LO, 28,) 0.18/0.215/0.25 mg-25 mcg Tab tablet    Chlamydia trachomatis & Neisseria gonorrhoeae, Amplified Detection     We had a discussion that this could be early appendicitis.  We discuss if the patient's abdominal pain starts to focus itself on the right lower abdomen, she is to be seen in the ER.   Otherwise I think she is safe to go home with watchful waiting and follow up if necessary.  I did refill her birth control for 9 months as that is when she is due for a physical.  I will get back to her on her result for her GC chlamydia check of urine by my chart.  She can follow up according to symptoms or at her next annual physical.    Patient Active Problem List   Diagnosis     Patellofemoral Syndrome     Low back pain     Dermatitis     Acne       No current outpatient prescriptions on file prior to visit.     No current facility-administered medications on file prior to visit.

## 2021-07-15 NOTE — PATIENT INSTRUCTIONS - HE
Patient Instructions by Liliana Oswald DO at 2021  3:00 PM     Author: Liliana Oswald DO Service: -- Author Type: Physician    Filed: 2021  3:27 PM Encounter Date: 2021 Status: Signed    : Liliana Oswald DO (Physician)         Patient Education     Possible Miscarriage (Threatened )  You may be having a miscarriage.  Common signs of a miscarriage are pain and bleeding. A small amount of bleeding can be normal during the first 3 months of pregnancy. Often the pain and bleeding stop, and you have a normal pregnancy and baby. But heavy bleeding or severe cramping can be an early sign of miscarriage. A miscarriage means an unexpected loss of your pregnancy.  At this time, your healthcare provider doesnt know whether you will have a miscarriage, or if things will clear up and your pregnancy will continue normally. This can be emotionally difficult. There is little that can be done to change the way you feel. But understand that miscarriages are common.  About 1 or 2 out of every 10 pregnancies end this way. Some even end before you know you are pregnant. This happens for a number of reasons, and usually the cause is never known. Its important you know that it is not your fault. It didnt happen because you did anything wrong.  Having sex or exercising does not cause a miscarriage. These activities are usually safe unless you have pain or bleeding or your doctor tells you to stop. Even minor falls wont cause a miscarriage. Miscarriages happen because things were not developing as they were supposed to. No medicine can prevent a miscarriage.  Again, understand that things are uncertain right now. You may still have some bleeding. This may be light spotting or like a period, and you may pass some tissue. You may have some cramping. This is why follow-up care is important.  Home care  To improve the chance of keeping your pregnancy, you should take these steps:    Rest in bed until the pain and  bleeding stop.    Dont have sex until your healthcare provider says its OK.    Use sanitary napkins instead of tampons.    Dont douche.    Dont take aspirin, ibuprofen, or naproxen.    Dont have alcoholic or caffeinated beverages or smoke.  Follow-up care  Make an appointment with your doctor within the next week, or as directed.  If you had an ultrasound, a radiologist will review it. You will be told of any new findings that may affect your care.  Call 911  Call 911 if you have:    Severe pain and very heavy bleeding    Severe lightheadedness, passing out, or fainting    Rapid heart rate    Difficulty breathing    Confusion or difficulty waking up  When to seek medical advice  Call your healthcare provider right away if any of these occur:    Vaginal bleeding or pain that lasts for more than 3 days    Heavy bleeding. This means soaking 1 new pad an hour over 3 hours.    Fever of 100.4 F (38 C) or higher, or as directed by your healthcare provider    Pain in your lower belly (abdomen) that gets worse    Weakness or dizziness    Passage of anything that resembles tissue. This would be pink or grayish membrane or solid material. Save the tissue in a clean container and bring it to your provider.  Date Last Reviewed: 9/1/2016 2000-2017 [employer]. [address] [place] [address], [place] [address]. All rights reserved. This information is not intended as a substitute for professional medical care. Always follow your healthcare professional's instructions.

## 2021-07-15 NOTE — TELEPHONE ENCOUNTER
Telephone Encounter by Catalina Cobb RN at 7/1/2021  8:32 PM     Author: Catalina Cobb RN Service: -- Author Type: Registered Nurse    Filed: 7/1/2021  8:41 PM Encounter Date: 7/1/2021 Status: Signed    : Catalina Cobb RN (Registered Nurse)       Pt is calling.    10 weeks pregnant. Cramping started this afternoon, near her pelvic floor. It is constant.  Light spotting just noticed only when she wiped. Light pink in color.  First pregnancy.  No recent intercourse.   Denies dysuria, urinary urgency or frequency.  Pain a 5/6 out of 10 on the pain scale when walking around. Pain is a 3/10 on the pain scale when sitting and laying.  Hast appt on Tuesday.  Care advice reviewed. Advised to schedule an appointment to be seen tomorrow.  When to call back reviewed per care advice and she verbalized understanding.      Reason for Disposition  ? [1] Intermittent lower abdominal pain (e.g., cramping) AND [2] present > 24 hours    Additional Information  ? Negative: Shock suspected (e.g., cold/pale/clammy skin, too weak to stand, low BP, rapid pulse)  ? Negative: Difficult to awaken or acting confused (e.g., disoriented, slurred speech)  ? Negative: Passed out (i.e., lost consciousness, collapsed and was not responding)  ? Negative: Sounds like a life-threatening emergency to the triager  ? Negative: [1] Vaginal bleeding AND [2] pregnant > 20 weeks  ? Negative: Not pregnant or pregnancy status unknown  ? Negative: SEVERE abdominal pain  ? Negative: [1] SEVERE vaginal bleeding (e.g., soaking 2 pads / hour, large blood clots) AND [2] present 2 or more hours  ? Negative: SEVERE dizziness (e.g., unable to stand, requires support to walk, feels like passing out)  ? Negative: [1] MODERATE vaginal bleeding (i.e., soaking 1 pad / hour; clots) AND [2] present > 6 hours  ? Negative: [1] MODERATE vaginal bleeding (i.e., soaking 1 pad / hour; clots) AND [2] pregnant > 12 weeks  ? Negative: Passed tissue (e.g., gray-white)  ?  Negative: Shoulder pain  ? Negative: Pale skin (pallor) of new onset or worsening  ? Negative: Patient sounds very sick or weak to the triager  ? Negative: [1] Constant abdominal pain AND [2] present > 2 hours  ? Negative: Fever > 100.4 F (38.0 C)    Protocols used: PREGNANCY - VAGINAL BLEEDING LESS THAN 20 WEEKS MEGHAA-A-SHARRI Cobb RN   [facility] Nurse Advisor  07/01/21 at 8:37 PM

## 2021-07-15 NOTE — PROGRESS NOTES
Assessment/Plan:  1. Normal first pregnancy confirmed, currently in first trimester  Pregnancy, Urine    prenatal vit no.130-iron-folic (PRENATAL VITAMIN) 27 mg iron- 800 mcg Tab tablet    US OB < 14 Weeks With Transvaginal    UPT today was positive.  Kyleena IUD removed 21, no period since removal and positive pregnancy test since then. Dating ultrasound ordered for 3 weeks from now to allow for more accurate dating.  - Reviewed pregnancy options. Pregnancy is desired.   - Prenatal vitamin started  - Initial OB labs will to be done at first OB visit, around 10-12 weeks gestation.  - 1st trimester education reviewed: nutrition, smoking, alcohol & drug use and safe medications  - All questions that were asked were answered.   - Call or return to clinic if severe cramping or abdominal pain or any vaginal bleeding    Isabel Beckham DO    CHIEF COMPLAINT;  Chief Complaint   Patient presents with     Establish Care     Was seeing Dr. Angulo- pt had covid shots- documented      Confirmation     Pregnancy confirmation- urine left right away today- had about a week of postive tests at home       HISTORY OF PRESENT ILLNESS:  Melissa is a 26 y.o.  female presenting to the clinic today for amenorrhea and pregnancy confirmation.     IUD out 21 had this in place for ~3 years. No periods while in place. Spotted 21 for an hour. +ovulation strip 5/3/21 intercourse that week.     Home UPT? positive  If pregnant, pregnancy is: planned, desired  Contraceptive method previously: Kyleena and OCPs  Symptoms of pregnancy: fatigue. No vaginal bleeding or abdominal pain.    She has not had any bleeding, abdominal pain or cramping. She has no. No vomiting. Weight loss has not occurred.     150 lbs prepregnancy weight.     Social History     Social History Narrative    Works full time as  for [employer].      VITALS:  Vitals:    21 1400   BP: 137/74   Patient Site: Left Arm   Patient Position: Sitting  "  Cuff Size: Adult Regular   Pulse: 80   SpO2: 100%   Weight: 151 lb 6.4 oz (68.7 kg)   Height: 5' 9\" (1.753 m)     Wt Readings from Last 3 Encounters:   05/19/21 151 lb 6.4 oz (68.7 kg)   04/16/21 149 lb (67.6 kg)   10/18/19 152 lb 4 oz (69.1 kg)     Body mass index is 22.36 kg/m .    PHYSICAL EXAM:  Constitutional: healthy, alert and no distress  Head: Normocephalic. Atraumatic   Cardiovascular: Regular rate and rhythm. No murmurs, clicks gallops or rub  Respiratory: Lungs clear to auscultation. No wheezing or crackles present   Abdomen:  Abdomen soft, non-tender.   Psychiatric: mentation appears normal and affect normal/bright    RECENT RESULTS  Recent Results (from the past 48 hour(s))   Pregnancy, Urine    Collection Time: 05/19/21  2:01 PM   Result Value Ref Range    Pregnancy Test, Urine Positive (!) Negative       MEDICATIONS:  Current Outpatient Medications   Medication Sig Dispense Refill     prenatal vit no.130-iron-folic (PRENATAL VITAMIN) 27 mg iron- 800 mcg Tab tablet Take 1 tablet by mouth daily. 90 tablet 3     No current facility-administered medications for this visit.        "

## 2021-07-15 NOTE — PROGRESS NOTES
"    Assessment & Plan     Vaginal bleeding in pregnancy, first trimester   at 11 weeks 6 days that had 1 day of brown vaginal bleeding with some associated cramping.  She had additional vaginal bleeding about 10 weeks gestation and found to have a small subchorionic hemorrhage on ultrasound, so suspect this is residual bleeding from that. Heart tones heard today and cervix is closed, thick, and high on speculum exam which is reassuring.  However, there was quite a bit of brown fluid collection in the vaginal vault so we will schedule follow-up ultrasound to assess for stability versus progression of the subchorionic hemorrhage.  Wet prep also performed.  A positive blood type so no RhoGam indicated.   - Wet prep - Clinic Collect  - US OB < 14 Weeks Single; Future    Isabel Beckham, DO  [facility]    Mirna Torres is a 26 year old who presents for the following health issues    HPI     Brown, curdy discharge along with faint cramping noted last evening and reports that has resolved.  Had bright red bleeding early in pregnancy around 10 weeks with associated cramping.  Found to have subchorionic hemorrhage at that time.  This had resolved until now.  She has not yet felt baby move.  She otherwise has felt well despite mild nausea but no vomiting.      Objective    /58   Pulse 75   Temp 98  F (36.7  C) (Oral)   Ht 1.753 m (5' 9\")   Wt 70.3 kg (155 lb)   LMP  (LMP Unknown)   BMI 22.89 kg/m    Body mass index is 22.89 kg/m .  Physical Exam   GENERAL: healthy, alert and no distress  ABDOMEN: soft, nontender, no hepatosplenomegaly, no masses and bowel sounds normal   (female): normal female external genitalia, normal urethral meatus, vaginal mucosa, cervix closed thick and high, brown fluid collection vaginal vault ,     EXAM: US OB < 14 WEEKS  LOCATION: [facility]  DATE/TIME: 2021 4:56 PM     INDICATION: threatened , 10 " weeks pregnant with cramping and spotting onset 7/1, cardiac activity present in clinic 7/2  COMPARISON: 06/08/2021  TECHNIQUE: Transabdominal scans were performed.     FINDINGS:  UTERUS: Single normal appearing intrauterine gestation sac.  CRL: Measures 3.4 cm, equals 10 weeks 3 days.  FETAL HEART RATE: 167 bpm.  AMNIOTIC FLUID: Normal.  PLACENTA: Small subchorionic 2.2 x 1.1 x 1 cm hemorrhage.     RIGHT OVARY: Normal.  LEFT OVARY: Normal.     IMPRESSION:  1.  Single living intrauterine gestation at 10 weeks 3 days. This agrees with the first ultrasound.  2.  EDC 01/28/2022 by first ultrasound.

## 2021-07-15 NOTE — PROGRESS NOTES
"Insertion of IUD  Date/Time: 10/18/2019 2:30 PM  Performed by: Ofelia Angulo MD  Authorized by: Ofelia Angulo MD   Consent: Verbal consent obtained. Written consent obtained.  Risks and benefits: risks, benefits and alternatives were discussed  Consent given by: patient  Patient understanding: patient states understanding of the procedure being performed  Patient consent: the patient's understanding of the procedure matches consent given  Procedure consent: procedure consent matches procedure scheduled  Relevant documents: relevant documents present and verified  Test results: test results available and properly labeled  Site marked: the operative site was not marked  Required items: required blood products, implants, devices, and special equipment available  Patient identity confirmed: verbally with patient  Time out: Immediately prior to procedure a \"time out\" was called to verify the correct patient, procedure, equipment, support staff and site/side marked as required.  Preparation: Patient was prepped and draped in the usual sterile fashion.  Local anesthesia used: no    Anesthesia:  Local anesthesia used: no    Sedation:  Patient sedated: no    Patient tolerance: Patient tolerated the procedure well with no immediate complications      IUD Insertion Procedure Note    Pre-operative Diagnosis: Desire for IUD    Post-operative Diagnosis: same    Indications: contraception    Procedure Details   Urine pregnancy test was done and result was negative.  The risks (including infection, bleeding, pain, and uterine perforation) and benefits of the procedure were explained to the patient and Written informed consent was obtained.      Cervix cleansed with Betadine. Uterus sounded to 6.5 cm and was anteverted. IUD inserted without difficulty. String visible and trimmed. Patient tolerated procedure well.    IUD Information:  Lot # [id number], Expiration date Feb 2021, " Kyleena.    Condition:  Stable    Complications:  None    Plan:    The patient was advised to call for any fever or for prolonged or severe pain or bleeding. She was advised to use OTC ibuprofen as needed for mild to moderate pain.

## 2021-07-15 NOTE — TELEPHONE ENCOUNTER
Left message to call back for: Melissa  Information to relay to patient:  See message from Dr. Angulo.  Katherine THOMAS CMA

## 2021-07-16 ENCOUNTER — HOSPITAL ENCOUNTER (OUTPATIENT)
Dept: ULTRASOUND IMAGING | Facility: CLINIC | Age: 26
Discharge: HOME | End: 2021-07-16
Attending: FAMILY MEDICINE | Admitting: FAMILY MEDICINE
Payer: COMMERCIAL

## 2021-07-16 DIAGNOSIS — O20.9 VAGINAL BLEEDING IN PREGNANCY, FIRST TRIMESTER: ICD-10-CM

## 2021-07-16 PROCEDURE — 76801 OB US < 14 WKS SINGLE FETUS: CPT

## 2021-07-20 PROBLEM — Z34.01 NORMAL FIRST PREGNANCY CONFIRMED, CURRENTLY IN FIRST TRIMESTER: Status: ACTIVE | Noted: 2021-05-19

## 2021-07-28 DIAGNOSIS — O20.9 VAGINAL BLEEDING IN PREGNANCY, FIRST TRIMESTER: Primary | ICD-10-CM

## 2021-07-28 RX ORDER — FERROUS SULFATE 325(65) MG
325 TABLET ORAL
Qty: 90 TABLET | Refills: 1 | Status: SHIPPED | OUTPATIENT
Start: 2021-07-28 | End: 2021-08-17

## 2021-08-03 ENCOUNTER — PRENATAL OFFICE VISIT (OUTPATIENT)
Dept: FAMILY MEDICINE | Facility: CLINIC | Age: 26
End: 2021-08-03
Payer: COMMERCIAL

## 2021-08-03 VITALS
WEIGHT: 159 LBS | DIASTOLIC BLOOD PRESSURE: 53 MMHG | HEIGHT: 69 IN | BODY MASS INDEX: 23.55 KG/M2 | HEART RATE: 87 BPM | SYSTOLIC BLOOD PRESSURE: 113 MMHG | TEMPERATURE: 98.3 F

## 2021-08-03 DIAGNOSIS — Z34.02 SUPERVISION OF NORMAL FIRST PREGNANCY IN SECOND TRIMESTER: Primary | ICD-10-CM

## 2021-08-03 DIAGNOSIS — O46.8X2 SUBCHORIONIC HEMATOMA IN SECOND TRIMESTER, SINGLE OR UNSPECIFIED FETUS: ICD-10-CM

## 2021-08-03 DIAGNOSIS — O41.8X20 SUBCHORIONIC HEMATOMA IN SECOND TRIMESTER, SINGLE OR UNSPECIFIED FETUS: ICD-10-CM

## 2021-08-03 PROCEDURE — 99207 PR PRENATAL VISIT: CPT | Performed by: FAMILY MEDICINE

## 2021-08-03 ASSESSMENT — MIFFLIN-ST. JEOR: SCORE: 1520.6

## 2021-08-03 NOTE — PROGRESS NOTES
"Melissa Rosario is a 26 year old  female who presents to clinic with her  Shaheen for a follow up OB visit. She is currently 14w4d with an estimated date of delivery of 2022 via 6wk US. She denies headache, chest pain, shortness of breath, abdominal pain/contractions, or abnormal discharge.     Has known subchorionic hemorrhage, went to urgent care 2021 due to ongoing bleeding.  Hemoglobin was stable at 14.7.  Subchorionic hemorrhage had increased slightly to approximately 6 cm.  Her bleeding has stopped since .  She has been late with her activity out of concern that hemorrhage may increase.  She continues to take her prenatal vitamin along with oral iron.  She has not yet felt fetal movement    OB History    Para Term  AB Living   1 0 0 0 0 0   SAB TAB Ectopic Multiple Live Births   0 0 0 0 0      # Outcome Date GA Lbr Khari/2nd Weight Sex Delivery Anes PTL Lv   1 Current                Physical exam:  /53   Pulse 87   Temp 98.3  F (36.8  C) (Oral)   Ht 1.753 m (5' 9\")   Wt 72.1 kg (159 lb)   LMP  (LMP Unknown)   BMI 23.48 kg/m    Wt Readings from Last 2 Encounters:   21 72.1 kg (159 lb)   07/15/21 70.3 kg (155 lb)       General: alert, female in no acute distress  Abdomen: gravid uterus appropriate for gestation age above pubic symphysis, non-tender, FHTs 155  Extremities: no edema    Prenatal labs  Blood type: A+  Hgb: 14  Pap: nml 3/26/2019  Hep B, HIV, syphilis, HIV negative  Rubella immune  UA/Ucx normal  NIPS negative    Assessment/Plan:  1. Supervision of normal first pregnancy in second trimester  2. Subchorionic hematoma in second trimester, single or unspecified fetus  Recurrent vaginal bleeding throughout first and into second trimester due to expanding subchorionic hemorrhage, most recently measuring 6 cm on 21.  Bleeding has recently subsided.  Vitally stable today with recent stable hemoglobin of 14.7.  No movement felt yet, but fetal " heart tones present on Doppler today.  Reviewed miscarriage warning signs and when to present for urgent follow-up.  She remains on prenatal vitamin and oral iron.    Follow up in 2 weeks for routine prenatal visit to reassess bleeding and heart tones    Isabel Beckham DO

## 2021-08-03 NOTE — PATIENT INSTRUCTIONS
When to call or come in to the hospital  If you have any bleeding or leakage of fluids.   If you have uterine cramping that does not resolve with rest and fluids.  If you have a headache not resolved with tylenol, right upper abdominal pain, or sudden onset of swelling.  You know your body best. Never hesitate to call or go to the hospital if something doesn't feel right!  Genetic Screening  Sampling of your blood to screen for genetic abnormalities, like Down's syndrome, in your baby  Done at 16-18 weeks gestation  Screening test only - further testing, like advanced ultrasound or amniocentesis, would be needed to confirm positive test!  Recommended for mothers over age 35, history of genetic abnormalities,   Talk to your doctor if you have further questions, or are interested in this screening test.   Breastfeeding  Breast feeding is best, for you and baby!   Recommendations are for exclusive breastfeeding for babies first 6 months of life.  Talk to your doctor if you have more questions about breastfeeding your baby.  Other Pregnancy Concerns  Continue to take a prenatal vitamin daily  Maintain an active, healthy lifestyle.   If this is your first baby, you can expect to start feeling movement at 20-24 weeks gestation. If this is your second or more pregnancy, you will generally start feeling movement at 18-22 weeks gestation.

## 2021-08-17 ENCOUNTER — PRENATAL OFFICE VISIT (OUTPATIENT)
Dept: FAMILY MEDICINE | Facility: CLINIC | Age: 26
End: 2021-08-17
Payer: COMMERCIAL

## 2021-08-17 VITALS
BODY MASS INDEX: 23.85 KG/M2 | HEART RATE: 81 BPM | HEIGHT: 69 IN | WEIGHT: 161 LBS | DIASTOLIC BLOOD PRESSURE: 61 MMHG | TEMPERATURE: 97.9 F | SYSTOLIC BLOOD PRESSURE: 112 MMHG

## 2021-08-17 DIAGNOSIS — O41.8X20 SUBCHORIONIC HEMATOMA IN SECOND TRIMESTER, SINGLE OR UNSPECIFIED FETUS: ICD-10-CM

## 2021-08-17 DIAGNOSIS — Z34.02 ENCOUNTER FOR SUPERVISION OF NORMAL FIRST PREGNANCY IN SECOND TRIMESTER: Primary | ICD-10-CM

## 2021-08-17 DIAGNOSIS — O46.8X2 SUBCHORIONIC HEMATOMA IN SECOND TRIMESTER, SINGLE OR UNSPECIFIED FETUS: ICD-10-CM

## 2021-08-17 PROCEDURE — 99207 PR PRENATAL VISIT: CPT | Performed by: FAMILY MEDICINE

## 2021-08-17 ASSESSMENT — MIFFLIN-ST. JEOR: SCORE: 1529.67

## 2021-08-17 NOTE — PATIENT INSTRUCTIONS
When to call or come in to the hospital  If you have any bleeding or leakage of fluids.   If you have lower abdominal/uterine cramping not relieved with rest and fluids.  If you have a headache not resolved with tylenol, right upper abdominal pain, or sudden onset of swelling.  You know your body best. Never hesitate to call or go to the hospital if something doesn't feel right!    Fetal Survey Ultrasound  We will arrange for an ultrasound around 22 weeks gestation. Many families look forward to this visit as a chance to try and determine the babies gender - however, it is an important exam to ensure that baby is developing and growing normally!    Consider childbirth education classes  Childbirth classes are a great way to learn what to expect from the remainder of pregnancy, tips for preparing and handeling the stresses of labor, and learning more about your . Classes are also great for learning more about breastfeeding! Discuss options for classes with your doctor if you are interested.

## 2021-08-17 NOTE — PROGRESS NOTES
"Melissa Rosario is a 26 year old  female who presents to clinic for a follow up OB visit. She is currently 16w4d with an estimated date of delivery of 2022 via 6wk US.  She had some brown vaginal spotting since her last visit until , 8/15/2021.  She had no associated uterine cramping.  Continues to have some intermittent nausea but no vomiting.  She has not yet felt baby move.    New concerns today: Going on a 2-hour plane ride and asking about recommendations    Taking prenatal vitamin daily?  Yes    OB History    Para Term  AB Living   1 0 0 0 0 0   SAB TAB Ectopic Multiple Live Births   0 0 0 0 0      # Outcome Date GA Lbr Khari/2nd Weight Sex Delivery Anes PTL Lv   1 Current                Physical exam:  /61   Pulse 81   Temp 97.9  F (36.6  C) (Oral)   Ht 1.753 m (5' 9\")   Wt 73 kg (161 lb)   LMP  (LMP Unknown)   BMI 23.78 kg/m    Wt Readings from Last 2 Encounters:   21 73 kg (161 lb)   21 72.1 kg (159 lb)       General: alert, female in no acute distress  Abdomen: gravid uterus appropriate for gestation age, non-tender, FHTs 155  Extremities: no edema    Prenatal labs  Blood type: A+  Hgb: 14  Pap: nml 3/26/2019  Hep B, HIV, syphilis, HIV negative  Rubella immune  UA/Ucx normal  NIPS negative    Assessment/Plan:  1. Encounter for supervision of normal first pregnancy in second trimester  Discussed warning signs to watch for and expectations for second trimester.  Reviewed VTE prophylaxis and COVID-19 protection while traveling via plane.  We will schedule fetal survey for 20 weeks and have her return for follow-up afterward.  - US OB > 14 Weeks; Future    2. Subchorionic hematoma in second trimester, single or unspecified fetus  Mild brown spotting since her last visit.  Hemoglobin has been stable at 14 so she can stop iron supplementation for now to help with constipation and nausea.  Fetal heart rate present on Doppler today.      Follow up in 4 weeks " for routine prenatal visit.     Isabel Beckham,

## 2021-09-10 ENCOUNTER — HOSPITAL ENCOUNTER (OUTPATIENT)
Dept: ULTRASOUND IMAGING | Facility: HOSPITAL | Age: 26
Discharge: HOME | End: 2021-09-10
Attending: FAMILY MEDICINE | Admitting: FAMILY MEDICINE
Payer: COMMERCIAL

## 2021-09-10 DIAGNOSIS — Z34.02 ENCOUNTER FOR SUPERVISION OF NORMAL FIRST PREGNANCY IN SECOND TRIMESTER: ICD-10-CM

## 2021-09-10 PROCEDURE — 76805 OB US >/= 14 WKS SNGL FETUS: CPT

## 2021-09-13 ENCOUNTER — PRENATAL OFFICE VISIT (OUTPATIENT)
Dept: FAMILY MEDICINE | Facility: CLINIC | Age: 26
End: 2021-09-13
Payer: COMMERCIAL

## 2021-09-13 VITALS
DIASTOLIC BLOOD PRESSURE: 56 MMHG | TEMPERATURE: 97.3 F | SYSTOLIC BLOOD PRESSURE: 113 MMHG | BODY MASS INDEX: 24.44 KG/M2 | HEART RATE: 73 BPM | HEIGHT: 69 IN | WEIGHT: 165 LBS

## 2021-09-13 DIAGNOSIS — Z34.02 NORMAL FIRST PREGNANCY CONFIRMED, CURRENTLY IN SECOND TRIMESTER: Primary | ICD-10-CM

## 2021-09-13 DIAGNOSIS — O46.8X2 SUBCHORIONIC HEMATOMA IN SECOND TRIMESTER, SINGLE OR UNSPECIFIED FETUS: ICD-10-CM

## 2021-09-13 DIAGNOSIS — O41.8X20 SUBCHORIONIC HEMATOMA IN SECOND TRIMESTER, SINGLE OR UNSPECIFIED FETUS: ICD-10-CM

## 2021-09-13 PROCEDURE — 90686 IIV4 VACC NO PRSV 0.5 ML IM: CPT | Performed by: FAMILY MEDICINE

## 2021-09-13 PROCEDURE — 99207 PR PRENATAL VISIT: CPT | Performed by: FAMILY MEDICINE

## 2021-09-13 PROCEDURE — 87591 N.GONORRHOEAE DNA AMP PROB: CPT | Performed by: FAMILY MEDICINE

## 2021-09-13 PROCEDURE — 87491 CHLMYD TRACH DNA AMP PROBE: CPT | Performed by: FAMILY MEDICINE

## 2021-09-13 PROCEDURE — 90471 IMMUNIZATION ADMIN: CPT | Performed by: FAMILY MEDICINE

## 2021-09-13 ASSESSMENT — MIFFLIN-ST. JEOR: SCORE: 1547.82

## 2021-09-13 NOTE — PROGRESS NOTES
"Melissa Rosario is a 26 year old  female who presents to clinic for a follow up OB visit with her  Shaheen. She is currently 20w3d with an estimated date of delivery of 2022 via 6 wk US. She denies headache, chest pain, shortness of breath, abdominal pain/contractions, vaginal bleeding, or abnormal discharge. She has felt baby move.     New concerns today: Has not had vaginal bleeding for approximately 1 month.  Started to feel baby move around 18 weeks.  Agreeable to flu vaccine.    OB History    Para Term  AB Living   1 0 0 0 0 0   SAB TAB Ectopic Multiple Live Births   0 0 0 0 0      # Outcome Date GA Lbr Khari/2nd Weight Sex Delivery Anes PTL Lv   1 Current                Physical exam:  /56   Pulse 73   Temp 97.3  F (36.3  C) (Oral)   Ht 1.753 m (5' 9\")   Wt 74.8 kg (165 lb)   LMP  (LMP Unknown)   BMI 24.37 kg/m      General: alert, female in no acute distress  Abdomen: gravid uterus appropriate for gestation age at umbilicus is to visit the way her glucose test, non-tender, FHTs 155  Extremities: no edema    Prenatal labs  Blood type: A+  Hgb: 14  Pap:  Hep B, HIV, syphilis, gonorrhea, chlamydia, HIV negative  Rubella immune  UA/Ucx normal  NIPS nml, boy!    EXAM: US OB > 14 WEEKS  LOCATION: [facility]  DATE/TIME: 9/10/2021 4:57 PM     INDICATION: fetal survey  COMPARISON: 2021 and 2021.  TECHNIQUE: Routine.     FINDINGS: Single intrauterine gestation, breech presentation. Placenta is located fundal and on the left. Amniotic fluid is normal. Uterus is normal. Maternal adnexa (right and left ovaries) show no abnormalities.     FETAL ANOMALY SCREEN: Survey of the fetal anatomy is normal. Specifically, normal posterior fossa, lateral ventricles, spine, stomach, bladder, kidneys, cord insertion, three-vessel cord, four-chamber heart, outflow tracts, extremities, face, and   diaphragm.       BIOMETRY:  Biparietal Diameter: 5.0 cm, 21 weeks " and 2 days  Head Circumference: 18.0 cm, 20 weeks and 3 days  Abdominal Circumference: 15.4 cm, 20 weeks and 5 days  Femur Length: 3.3 cm, 20 weeks and 2 days     Estimated Fetal Weight: 354 g  EFW Percentile: 55%     Fetal Heart Rate: 150 bpm  Cervical Length: 4.7 cm     EDC by First US exam: 01/28/2022  EDC by This US exam: 01/23/2022     Composite Age by First US: 20 weeks and 0 days   Composite Age by This US: 20 weeks and 5 days                                                                      IMPRESSION:    1.  Single living intrauterine gestation.  2.  Based on first ultrasound, composite age of 20 weeks and 0 days with EDC 01/28/2022.  3.  Normal interval growth.  4.  Normal fetal survey.    Assessment/Plan:  1. Normal first pregnancy confirmed, currently in second trimester  G1, P0 at 20 weeks 3 days via 6-week ultrasound.  Doing well, has begun to feel fetal movement.  Reviewed normal fetal survey.  Influenza vaccine given.  Plans to breast-feed.  GC/Chlamydia not previously obtained so we will do this today. Discussed expectations of second trimester and when to call/come in.  - Neisseria gonorrhoeae PCR  - Chlamydia trachomatis PCR    2. Subchorionic hematoma in second trimester, single or unspecified fetus  Spoke with ultrasound technician who reviewed patient's recent fetal survey.  No sign of subchorionic hematoma visualized, so likely resolved.  Her vaginal bleeding has also subsided.     Follow up in 4 weeks for routine prenatal visit.    Isabel Beckham DO

## 2021-09-13 NOTE — PATIENT INSTRUCTIONS
When to call or come in to the hospital  If you have any bleeding or leakage of fluids.   If you have lower abdominal/uterine cramping not relieved with rest and fluids.  If you have a headache not resolved with tylenol, right upper abdominal pain, or sudden onset of swelling.  You know your body best. Never hesitate to call or go to the hospital if something doesn't feel right!    Consider childbirth education classes  Childbirth classes are a great way to learn what to expect from the remainder of pregnancy, tips for preparing and handeling the stresses of labor, and learning more about your . Classes are also great for learning more about breastfeeding! Discuss options for classes with your doctor if you are interested.

## 2021-09-15 LAB
C TRACH DNA SPEC QL NAA+PROBE: NEGATIVE
N GONORRHOEA DNA SPEC QL NAA+PROBE: NEGATIVE

## 2021-10-13 ENCOUNTER — PRENATAL OFFICE VISIT (OUTPATIENT)
Dept: FAMILY MEDICINE | Facility: CLINIC | Age: 26
End: 2021-10-13
Payer: COMMERCIAL

## 2021-10-13 VITALS
BODY MASS INDEX: 24.73 KG/M2 | WEIGHT: 167 LBS | TEMPERATURE: 97.4 F | DIASTOLIC BLOOD PRESSURE: 56 MMHG | SYSTOLIC BLOOD PRESSURE: 104 MMHG | HEART RATE: 70 BPM | HEIGHT: 69 IN

## 2021-10-13 DIAGNOSIS — Z34.02 ENCOUNTER FOR SUPERVISION OF NORMAL FIRST PREGNANCY IN SECOND TRIMESTER: Primary | ICD-10-CM

## 2021-10-13 LAB — GLUCOSE 1H P 50 G GLC PO SERPL-MCNC: 103 MG/DL (ref 70–129)

## 2021-10-13 PROCEDURE — 99207 PR PRENATAL VISIT: CPT | Performed by: FAMILY MEDICINE

## 2021-10-13 PROCEDURE — 36415 COLL VENOUS BLD VENIPUNCTURE: CPT | Performed by: FAMILY MEDICINE

## 2021-10-13 PROCEDURE — 82952 GTT-ADDED SAMPLES: CPT | Performed by: FAMILY MEDICINE

## 2021-10-13 ASSESSMENT — MIFFLIN-ST. JEOR: SCORE: 1556.89

## 2021-10-13 NOTE — PATIENT INSTRUCTIONS
When to call or come in to the hospital  If you notice a decrease in your baby's movement.   If your begin to experience contractions that are 5 minutes or less apart and lasting for over 45 seconds, or if contractions are becoming more painful.  If you have any bleeding or leakage of fluids.   If you have a headache not resolved with tylenol, right upper abdominal pain, or sudden onset of swelling.  You know your body best. Never hesitate to call or go to the hospital if something doesn't feel right!  Blood Glucose Screening During Pregnancy   Gestational diabetes is diabetes that only pregnant women get. Changes in your body during pregnancy can cause high blood sugar (glucose). This can cause problems for you and your baby. It is a serious condition, but it can be controlled.  What to Know If You Test Positive   Gestational diabetes is treatable. The best way to control gestational diabetes is to find out you have it as early as possible and start treatment quickly.   Gestational diabetes can cause problems for the mother during pregnancy. It can also cause problems with the baby during pregnancy, delivery, and after. Treatment greatly lowers the chance for problems.   The changes in your body that cause gestational diabetes normally occur only when you are pregnant. After the baby is born, your body goes back to normal and the condition goes away. You may be more likely to have type 2 diabetes later, though. So talk to your doctor about ways to help prevent type 2 diabetes.  Treating Gestational Diabetes   You ll need to check your blood sugar regularly. You can do this at home by pricking your finger and checking a drop of blood on a glucose monitor. Your health care provider will show you how and when to check your blood sugar and discuss your target blood sugar level.   To manage your blood sugar, you will be given a special plan. It will likely involve planning your meals and getting regular exercise. Some  women need to take a hormone called insulin, or an oral hypoglycemic medication to help control their blood sugar.    Making Plans for Feeding Your Baby  By this point, you probably have read a lot about feeding your baby. Breastfeed or formula? Each mother s decision is her own and [facility] respects you and your choices. We ve gathered information on both breastfeeding and formula feeding to help with your decision. Talking with your physician or nurse-midwife can also help in your decision. However you plan to feed your baby, [facility] encourage rooming in with your baby, skin-to-skin contact and feeding your baby based on his or her cues.    Skin-to-skin contact  Being close to mom helps your baby adjust to life outside of the womb. It helps your baby regulate their body temperature, heart rate, and breathing. Your baby will usually be placed skin-to-skin immediately following birth or as soon as possible, if medical intervention is needed.    Rooming-In  Having your baby stay with you in your room is called  rooming-in.  Keeping your baby in your room helps you to learn how to care for your baby by getting to know your baby s cues, body rhythms and sleep cycle.       Cue-based feeding  Cues (signals) are baby s way of telling you what he or she wants. When you learn your infant s cues, you know how to care for and feed your baby. Feeding cues are the licking and smacking of lips, bringing their fist to their mouth, and a reflex called  rooting - where baby turns and opens his or her mouth, searching for the breast or bottle. Crying is a late feeding cue. Babies can feed frequently, often at least 8 times in 24 hours.  Breastfeeding facts  Breast milk is the best source of nutrition for your baby and is available at birth. In the first couple of days, your milk volume is already starting to increase, though it may not be noticeable. Breastfeed frequently to increase your milk supply.  Within three to five days, you will begin to notice larger milk volumes. An increase in breast size, heaviness and firmness are often described as the milk  coming in.  Frequent breastfeeding can help breasts from getting overly firm and painful. You will know the baby is getting enough milk if your baby is having wet and dirty diapers and gaining weight.     If your goal is to exclusively breastfeed, it is important to not use any formula or artificial nipples (including bottles and pacifiers) while your baby is learning to breastfeed. While it may seem like an  easy  option to give your baby a bottle, formula should only be given if there is a medical reason for your baby to have it.    Positioning and attachment   Get comfortable. Use pillows as needed to support your arms and baby. Hold baby close at the level of your breast, facing you in a tummy to tummy position. Skin to skin helps with this. Position the baby with his or her nose by the nipple. There should be a straight line from baby s ear to shoulder to hips.  Tickle your baby s lips or wait for baby to open mouth wide, bring baby to breast by leading with the chin. Aim the nipple at the roof of baby s mouth. A rapid sucking pattern is followed by longer, drawing pattern with occasional swallows heard. When baby is correctly latched, your nipple and much of the areola are pulled well into baby s mouth.      Returning to work or school  Focus on a good start to breastfeeding. Many women continue to provide breastmilk for their baby when they return to work or school. Making plans about where to pump and store milk can make the transition go well. Talk with other mothers who have also returned to work or school for tips and support. Your employer s Human Resource department may be a resource as well.        babies can mean fewer  sick  days for you.    A quality breast pump will also save time and add comfort. Check with your insurance prior to  giving birth for breast pump coverage. Many insurance companies include a pump within your benefits.    Wait until your baby is at least three weeks old to introduce a bottle for the first time. Have someone besides you give the bottle.    Breastfeed when you are with your baby. Reserve your bottles of breast milk for when you are away.     Your breasts will need to be  emptied  either by your baby or a pump. Plan to pump at least twice in an eight hour day.    If you cannot pump at work, continue breastfeeding at home. Any amount of breast milk is worth giving to your baby.    Formula feeding facts  If you are planning to use formula to feed your baby, you will want to make some preparations ahead of time. Talk to your doctor or nurse-midwife about what type of formula to use. Some are iron-fortified, meaning they have extra iron in them. You will want to purchase formula and bottles before your baby is born to be sure you are ready after you return from the hospital. [facility] do not provide formula samples to take home.    Be sure to follow formula mixing directions closely. Regular milk in the dairy case at the grocery store should not be given to babies under 1 year old. Baby formula is sold in several forms including:    Ready-to-use. This is the most expensive, but no mixing is necessary.    Concentrated liquid. This is less expensive than ready-to-use and you mix with water.    Powder. This is the least expensive. You mix one level scoop of powdered formula with two ounces of water and stir well.    Most babies need 2.5 ounces of formula per pound of body weight each day. This means an 8-pound baby may drink about 20 ounces of formula a day; however, this is just an estimate. The most important thing is to pay attention to your baby s cues. If your baby is always fussy, needs more iron or has certain food allergies, your physician may suggest you change your baby s formula to a different kind.    How do I warm my baby s bottles?  You may feed your baby a bottle without warming it first. It is OK for the breast milk or formula to be cool or room temperature. If your baby seems to prefer it warmed, you can put the filled bottle in a container of warm water and let it stand for a few minutes. Check the temperature of the liquid on your skin before feeding it to your baby; to be sure it isn t too hot. Do not heat bottles in the microwave. Microwaves heat food and liquids unevenly, and this can cause hot spots that can burn your baby.    How do I clean and sterilize bottles?  Sterilize bottles and nipples before you use them for the first time. You can do this by putting them in boiling water for 5 minutes. After that first time, you can wash them in hot and soapy water. Rinse them carefully to be sure there is no soap left on them. You can also wash them in the .    [facility] [phone] ([phone])

## 2021-10-13 NOTE — PROGRESS NOTES
"Melissa Rosario is a 26 year old  female who presents to clinic for a follow up OB visit. She is currently 24w5d with an estimated date of delivery of 2022 via 6 wk US. She denies headache, chest pain, shortness of breath, abdominal pain/contractions, vaginal bleeding, or abnormal discharge. She continues to feel baby move.     New concerns today: Occasional gas pain.  Feels bowel movements are soft and regular.    OB History    Para Term  AB Living   1 0 0 0 0 0   SAB TAB Ectopic Multiple Live Births   0 0 0 0 0      # Outcome Date GA Lbr Khari/2nd Weight Sex Delivery Anes PTL Lv   1 Current                Physical exam:  /56   Pulse 70   Temp 97.4  F (36.3  C) (Oral)   Ht 1.753 m (5' 9\")   Wt 75.8 kg (167 lb)   LMP  (LMP Unknown)   BMI 24.66 kg/m      General: alert, female in no acute distress  Abdomen: gravid uterus appropriate for gestation age at 24 cm above pubic symphysis, non-tender, FHTs 155  Extremities: no edema    Prenatal labs  Blood type: A+  Hgb: 14  Pap: 3/26/219 nml  Hep B, HIV, syphilis, gonorrhea, chlamydia, HIV negative  Rubella immune  UA/Ucx normal  NIPS nml, boy!    Assessment/Plan:  1. Encounter for supervision of normal first pregnancy in second trimester  Reviewed normal fetal survey.  Recommended increasing water and fiber intake for gas pain.  Patient is planning to complete childbirth education classes.   If boy, would to have him circumcised, will be done as outpatient for better insurance coverage.  Received influenza vaccine at last visit.  1 hour GTT to be done today.      Follow up in 4 weeks for routine prenatal visit.     Isabel Beckham,       "

## 2021-11-17 ENCOUNTER — PRENATAL OFFICE VISIT (OUTPATIENT)
Dept: FAMILY MEDICINE | Facility: CLINIC | Age: 26
End: 2021-11-17
Payer: COMMERCIAL

## 2021-11-17 VITALS
HEART RATE: 78 BPM | SYSTOLIC BLOOD PRESSURE: 110 MMHG | WEIGHT: 176 LBS | DIASTOLIC BLOOD PRESSURE: 74 MMHG | BODY MASS INDEX: 25.99 KG/M2

## 2021-11-17 DIAGNOSIS — Z34.03 ENCOUNTER FOR SUPERVISION OF NORMAL FIRST PREGNANCY IN THIRD TRIMESTER: Primary | ICD-10-CM

## 2021-11-17 LAB — HGB BLD-MCNC: 13.9 G/DL (ref 11.7–15.7)

## 2021-11-17 PROCEDURE — 90715 TDAP VACCINE 7 YRS/> IM: CPT | Performed by: FAMILY MEDICINE

## 2021-11-17 PROCEDURE — 36415 COLL VENOUS BLD VENIPUNCTURE: CPT | Performed by: FAMILY MEDICINE

## 2021-11-17 PROCEDURE — 90471 IMMUNIZATION ADMIN: CPT | Performed by: FAMILY MEDICINE

## 2021-11-17 PROCEDURE — 85018 HEMOGLOBIN: CPT | Performed by: FAMILY MEDICINE

## 2021-11-17 PROCEDURE — 86780 TREPONEMA PALLIDUM: CPT | Performed by: FAMILY MEDICINE

## 2021-11-17 PROCEDURE — 99207 PR PRENATAL VISIT: CPT | Performed by: FAMILY MEDICINE

## 2021-11-17 NOTE — PROGRESS NOTES
Melissa Rosario is a 26 year old  female who presents to clinic for a follow up OB visit with her  Jos. She is currently 29w5d with an estimated date of delivery of 2022 via 6-week ultrasound. She denies headache, chest pain, shortness of breath, abdominal pain/contractions, vaginal bleeding, or abnormal discharge. She continues to feel baby move.     New concerns today: none    OB History    Para Term  AB Living   1 0 0 0 0 0   SAB IAB Ectopic Multiple Live Births   0 0 0 0 0      # Outcome Date GA Lbr Khari/2nd Weight Sex Delivery Anes PTL Lv   1 Current                Physical exam:  LMP  (LMP Unknown)     General: alert, female in no acute distress  Abdomen: gravid uterus appropriate for gestation age at 29 cm above pubic symphysis, non-tender, FHTs 150  Extremities: no edema    Prenatal labs  Blood type: A+  Hgb: 14  Pap: 3/26/219 nml  Hep B, HIV, syphilis, gonorrhea, chlamydia, HIV negative  Rubella immune  UA/Ucx normal  NIPS nml, boy!  Nml 1 hr GTT    Assessment/Plan:  1. Encounter for supervision of normal first pregnancy in third trimester  Reviewed nml 1 hr GTT  A+ blood type, no rhogam indicated   TDAP immunization today  Patient plans to breastfeed.   Discussed when to call/come in.    Follow up in 2 weeks for routine prenatal visit.     Isabel Beckham DO

## 2021-11-17 NOTE — PATIENT INSTRUCTIONS
When to call or come in to the hospital  If you notice a decrease in your baby's movement.   If your begin to experience contractions that are 5 minutes or less apart and lasting for over 45 seconds, or if contractions are becoming more painful.  If you have any bleeding or leakage of fluids.   If you have a headache not resolved with tylenol, right upper abdominal pain, or sudden onset of swelling.  You know your body best. Never hesitate to call or go to the hospital if something doesn't feel right!  After-baby Birth Control Methods   It is important to consider contraception after your baby is born if you would like to prevent a pregnancy in the near future. If you are breast feeding, talk with your doctor to determine the best method for you. It is recommended that you wait 12 months after the birth of your baby to get pregnant again.   Condoms   Latex condoms can prevent pregnancy and STDs. Condoms work best when used with spermicide that is placed inside the vagina as well as inside the condom. Use only water-based lubricants. Petroleum based products (such as Vaseline and many massage oils) can weaken the latex and cause it to break.   Iud   IUD's are made of flexible plastic and must be inserted into your uterus by a doctor. The IUD works by stopping the fertilized egg from attaching itself to the uterus. IUDs may increase the risk of getting a pelvic infection (PID), which can lead to infertility if not diagnosed and treated early. This is a great option after delivery of your baby! These last for 3, 5, or 10 years depending up on which type you choose, but can be removed earlier if you decide you would like to get pregnant sooner.  Tubal Ligation & Vasectomy   These are good choices for women and men who know that they do not want to have any more children.     HORMONES   Birth control pills, hormone implants and shots work by stopping ovulation (release of the egg from the ovary). Implants are placed in  the upper arm by a minor surgical incision. They last for five years, but can be removed by your doctor if you decide to get pregnant. Hormone injections must be repeated every three months. The Pill must be taken every day.   All hormones can have side effects and create certain health risks. They are very effective in preventing pregnancy but they do not prevent STDs. You can talk more about the risks and benefits with your doctor.

## 2021-11-18 LAB — T PALLIDUM AB SER QL: NONREACTIVE

## 2021-12-06 ENCOUNTER — PRENATAL OFFICE VISIT (OUTPATIENT)
Dept: FAMILY MEDICINE | Facility: CLINIC | Age: 26
End: 2021-12-06
Payer: COMMERCIAL

## 2021-12-06 VITALS
DIASTOLIC BLOOD PRESSURE: 69 MMHG | HEART RATE: 97 BPM | BODY MASS INDEX: 26.32 KG/M2 | SYSTOLIC BLOOD PRESSURE: 121 MMHG | WEIGHT: 178.25 LBS

## 2021-12-06 DIAGNOSIS — M79.18 PAIN IN SYMPHYSIS PUBIS DURING PREGNANCY: ICD-10-CM

## 2021-12-06 DIAGNOSIS — Z34.01 NORMAL FIRST PREGNANCY CONFIRMED, CURRENTLY IN FIRST TRIMESTER: Primary | ICD-10-CM

## 2021-12-06 DIAGNOSIS — O99.891 PAIN IN SYMPHYSIS PUBIS DURING PREGNANCY: ICD-10-CM

## 2021-12-06 PROCEDURE — 99207 PR PRENATAL VISIT: CPT | Performed by: FAMILY MEDICINE

## 2021-12-06 NOTE — PROGRESS NOTES
Melissa Rosario is a 26 year old  female who presents to clinic for a follow up OB visit with her  Shaheen. She is currently 32w3d with an estimated date of delivery of 2022 via 6-week ultrasound. She denies headache, chest pain, shortness of breath, abdominal pain/contractions, vaginal bleeding, or abnormal discharge. She continues to feel baby move.     New concerns today: pubic bone pain. Belly band.     OB History    Para Term  AB Living   1 0 0 0 0 0   SAB IAB Ectopic Multiple Live Births   0 0 0 0 0      # Outcome Date GA Lbr Khari/2nd Weight Sex Delivery Anes PTL Lv   1 Current                Physical exam:  /69 (BP Location: Left arm, Patient Position: Sitting, Cuff Size: Adult Regular)   Pulse 97   Wt 80.9 kg (178 lb 4 oz)   LMP  (LMP Unknown)   Breastfeeding No   BMI 26.32 kg/m      General: alert, female in no acute distress  Abdomen: gravid uterus appropriate for gestation age at 31 cm above pubic symphysis, non-tender, FHTs 145  Extremities: no edema       Prenatal labs  Blood type: A+  Hgb: 14  Pap: 3/26/219 nml  Hep B, HIV, syphilis, gonorrhea, chlamydia, HIV negative  Rubella immune  UA/Ucx normal  NIPS nml, boy!  Desires circumcision in clinic  Nml 1 hr GTT  Influenza given 2021  Tdap given 2021    Assessment/Plan:    1. Normal first pregnancy confirmed, currently in first trimester  Discussed pre-term labor signs and symptoms and when to call/come in.     2. Pain in symphysis pubis during pregnancy  Reviewed belly band for additional support, warm bath, Tylenol as needed, and listening to body regarding reducing activity.      Follow up in 2 weeks for routine prenatal visit.     Isabel Beckham DO

## 2021-12-06 NOTE — PATIENT INSTRUCTIONS
[facility] Information  If you have any further concerns or wish to schedule another appointment, please call our office at [phone].  Call your doctor if you experience any bleeding or abdominal cramping early in pregnancy.     For uncomplicated pregnancies, you will be seeing your doctor once a month until you are 28 weeks, then you will see your doctor twice a month. You will begin weekly visits at 36 weeks until you deliver.     If you have a medical emergency, please call 911.    [facility] Labor and Delivery: [phone]  [facility] Labor and Delivery: [phone]    When to call or come in to the hospital    If you notice a decrease in your baby's movement.     If your begin to experience contractions that are 5 minutes or less apart and lasting for over 45 seconds, or if contractions are becoming more painful.    If you have any bleeding or leakage of fluids.     If you have a headache not resolved with tylenol, right upper abdominal pain, or sudden onset of swelling.    You know your body best. Never hesitate to call or go to the hospital if something doesn't feel right!    Preparing for the hospital  You re likely feeling anxious as your child s birth approaches. This is normal. To give yourself some peace of mind, pack a bag 3-4 weeks before your due date. Here is a list of things to remember:    Personal care items like toothbrush, hair brush, lip balm, lotion, shampoo, glasses, contacts    Nightgown, bathrobe, slippers    Several pairs of underwear    Comfortable clothes for you to wear home    Camera with new batteries    Cell phone and     Insurance information and any other paperwork needed for your hospital stay    Clothes for baby to wear home    An infant, rear-facing car seat for bringing home your baby (this is required by law)    Controlling Back Pain  As your body changes during pregnancy, your back must work in new ways. Back pain is due to many causes. Physical  changes in your body can strain your back and its supporting muscles. Also, hormones (chemicals that carry messages throughout the body) increase during pregnancy. This can affect how the muscles and joints work together. All of these changes can lead to pain in the upper or lower back or pelvis. Some pregnant women have sciatica, pain caused by pressure on the sciatic nerve running down the back of the leg. Ask your healthcare provider for specific tips and exercises to help control your back pain.    Tips to Help You Rest  Good rest and sleep will help you feel better. Here are some ideas:    Ask your partner to massage your shoulders, neck, or back.    Limit the errands you do each day.    Lie down in the afternoon or after work for a few minutes.    Take a warm bath before you go to sleep.    Drink warm milk or teas without caffeine.    Avoid coffee, black tea, and cola.    Preventing Heartburn    Avoid spicy or acidic foods.     Eat small amounts more often.    Wait 2 hours after eating before lying down     Sleep with your upper body raised 6 inches.    May use Tums as needed. Talk to your doctor about other medications to try.

## 2021-12-20 ENCOUNTER — PRENATAL OFFICE VISIT (OUTPATIENT)
Dept: FAMILY MEDICINE | Facility: CLINIC | Age: 26
End: 2021-12-20
Payer: COMMERCIAL

## 2021-12-20 VITALS
WEIGHT: 180 LBS | HEIGHT: 69 IN | TEMPERATURE: 97.9 F | SYSTOLIC BLOOD PRESSURE: 117 MMHG | BODY MASS INDEX: 26.66 KG/M2 | DIASTOLIC BLOOD PRESSURE: 66 MMHG | HEART RATE: 81 BPM

## 2021-12-20 DIAGNOSIS — O99.891 PAIN IN SYMPHYSIS PUBIS DURING PREGNANCY: ICD-10-CM

## 2021-12-20 DIAGNOSIS — Z34.01 NORMAL FIRST PREGNANCY CONFIRMED, CURRENTLY IN FIRST TRIMESTER: Primary | ICD-10-CM

## 2021-12-20 DIAGNOSIS — M79.18 PAIN IN SYMPHYSIS PUBIS DURING PREGNANCY: ICD-10-CM

## 2021-12-20 DIAGNOSIS — O26.843 UTERINE SIZE DATE DISCREPANCY PREGNANCY, THIRD TRIMESTER: ICD-10-CM

## 2021-12-20 PROCEDURE — 99207 PR PRENATAL VISIT: CPT | Performed by: FAMILY MEDICINE

## 2021-12-20 ASSESSMENT — MIFFLIN-ST. JEOR: SCORE: 1615.85

## 2021-12-20 NOTE — PROGRESS NOTES
Melissa Rosario is a 26 year old  female who presents to clinic for a follow up OB visit. She is currently 34w3d with an estimated date of delivery of 2022 via 6-week ultrasound. She denies headache, chest pain, shortness of breath, abdominal pain/contractions, vaginal bleeding, or abnormal discharge. She continues to feel baby move.     New concerns today: none    OB History    Para Term  AB Living   1 0 0 0 0 0   SAB IAB Ectopic Multiple Live Births   0 0 0 0 0      # Outcome Date GA Lbr Khari/2nd Weight Sex Delivery Anes PTL Lv   1 Current                Physical exam:  LMP  (LMP Unknown)     General: alert, female in no acute distress  Abdomen: gravid uterus appropriate for gestation age at 31 cm above pubic symphysis, non-tender, FHTs 150  Extremities: no edema    Prenatal labs  Blood type: A+  Hgb: 14-->13.9  Pap: 3/26/219 nml  Hep B, HIV, syphilis x2, gonorrhea, chlamydia, HIV negative  Rubella immune  UA/Ucx normal  NIPS nml, boy!  Desires circumcision in clinic  Nml 1 hr GTT  Influenza given 2021  Tdap given 2021  Plans to breastfeed    Assessment/Plan:  1. Normal first pregnancy confirmed, currently in first trimester  Plans for breastfeeding, pain control during labor discussed.  Discussed preferences during labor.   Discussed pre-term labor signs and symptoms and when to call/come in.   Patient to consider COVID booster.     2. Uterine size date discrepancy pregnancy, third trimester  Measuring 3 cm below expected average. Possible transverse lie noted on Leopold's today. Will assess for growth with ultrasound.  - US OB > 14 Weeks; Future    3. Pain in symphysis pubis during pregnancy  Ongoing discomfort, primarily with activity. Patient declined PT but will consider this postpartum.      Follow up in 2 weeks for routine prenatal care.    Isabel Beckham DO

## 2021-12-20 NOTE — PATIENT INSTRUCTIONS
[facility] Information  If you have any further concerns or wish to schedule another appointment, please call our office at [phone].  Call your doctor if you experience any bleeding or abdominal cramping early in pregnancy.     For uncomplicated pregnancies, you will be seeing your doctor once a month until you are 28 weeks, then you will see your doctor twice a month. You will begin weekly visits at 36 weeks until you deliver.     If you have a medical emergency, please call 911.    [facility] Labor and Delivery: [phone]  [facility] Labor and Delivery: [phone]    When to call or come in to the hospital    If you notice a decrease in your baby's movement.     If your begin to experience contractions that are 5 minutes or less apart and lasting for over 45 seconds, or if contractions are becoming more painful.    If you have any bleeding or leakage of fluids.     If you have a headache not resolved with tylenol, right upper abdominal pain, or sudden onset of swelling.    You know your body best. Never hesitate to call or go to the hospital if something doesn't feel right!    Preparing for the hospital  You re likely feeling anxious as your child s birth approaches. This is normal. To give yourself some peace of mind, pack a bag 3-4 weeks before your due date. Here is a list of things to remember:    Personal care items like toothbrush, hair brush, lip balm, lotion, shampoo, glasses, contacts    Nightgown, bathrobe, slippers    Several pairs of underwear    Comfortable clothes for you to wear home    Camera with new batteries    Cell phone and     Insurance information and any other paperwork needed for your hospital stay    Clothes for baby to wear home    An infant, rear-facing car seat for bringing home your baby (this is required by law)  Managing Labor Pain   There are many ways to manage pain during labor. It can often be done with no anesthesia or strong pain medications. Talk  "to your health care provider about any options you would like to explore.   Help from Relaxation  Some of these are learned in special classes. Your health care provider can help you find classes. The hospital has a tub you can use during early labor. These methods may be of help to you:     Breathing techniques     A warm tub between contractions     Massage and therapeutic touch by your support person or labor      Reading materials that are comforting or inspiring     Music that is soothing     Hypnosis     Acupuncture and acupressure     Heat and cold applicatio  Help From Analgesics   Analgesics are mild medications that reduce pain. They can be used along with some relaxation methods. They can give you pain relief without total loss of feeling. They may lessen the pain of strong contractions. You may feel well enough to nap between contractions. They have little effect on your baby if given early in labor. This may be done by injection or by IV.   Help from Inhaled Medications  Nitrous oxide (\"laughing gas\") mixed with oxygen is another option for anesthesia. This is administered by breathing through a mask that you can take off or put on when you would like. It is safe for you and your baby during labor. Women have various responses to pain relief from this method, typically you will feel less bothered by the pain.   Help From Anesthetics   Anesthesia involves blockage of all feeling including pain. It can be given in the form of local anesthesia or general anesthesia.  Anesthesia is a type of medication to prevent pain. It is often used in labor. It may numb only one region of your body. This is called regional anesthesia. Or it may let you sleep during surgery. This is called general anesthesia. This type of medication is given by a trained specialist. When possible, regional anesthesia will be used. This is so you can be awake during your baby s birth.   Regional Anesthesia   Regional anesthesia may " be used to numb your lower body for a vaginal or  birth. It does not go into your bloodstream. This means that little or none of it will reach your baby. There are two kinds:     Epidural. This is most often given while you sit up or lie on your side. A needle with a flexible tube (catheter) is put in your lower back. The needle is then removed. The anesthetic is sent through the catheter. A pump may be attached. This gives you a constant level of anesthetic. An epidural often only partly affects muscle control. This means you should still be able to push for a vaginal birth.     Spinal. This is most often given in one dose right before delivery. It acts fast. You may sit up or lie down when it is injected. It may affect muscle control in your lower body. This includes the ability to push.  General Anesthesia   General anesthesia lets you sleep and keeps you free of pain during surgery. It may be used for a . It may be given as an injection. It may be given as an inhaled gas. Or it may be given as both. Delivery often occurs before the medication has reached the baby.

## 2021-12-22 ENCOUNTER — HOSPITAL ENCOUNTER (OUTPATIENT)
Dept: ULTRASOUND IMAGING | Facility: HOSPITAL | Age: 26
Discharge: HOME | End: 2021-12-22
Attending: FAMILY MEDICINE | Admitting: FAMILY MEDICINE
Payer: COMMERCIAL

## 2021-12-22 DIAGNOSIS — O26.843 UTERINE SIZE DATE DISCREPANCY PREGNANCY, THIRD TRIMESTER: ICD-10-CM

## 2021-12-22 PROCEDURE — 76816 OB US FOLLOW-UP PER FETUS: CPT

## 2022-01-03 ENCOUNTER — PRENATAL OFFICE VISIT (OUTPATIENT)
Dept: FAMILY MEDICINE | Facility: CLINIC | Age: 27
End: 2022-01-03
Payer: COMMERCIAL

## 2022-01-03 VITALS
SYSTOLIC BLOOD PRESSURE: 111 MMHG | WEIGHT: 183 LBS | DIASTOLIC BLOOD PRESSURE: 71 MMHG | TEMPERATURE: 97.8 F | BODY MASS INDEX: 27.11 KG/M2 | HEART RATE: 97 BPM | HEIGHT: 69 IN

## 2022-01-03 DIAGNOSIS — Z34.03 ENCOUNTER FOR SUPERVISION OF NORMAL FIRST PREGNANCY IN THIRD TRIMESTER: Primary | ICD-10-CM

## 2022-01-03 DIAGNOSIS — Z23 HIGH PRIORITY FOR 2019-NCOV VACCINE: ICD-10-CM

## 2022-01-03 PROCEDURE — 0004A COVID-19,PF,PFIZER (12+ YRS): CPT | Performed by: FAMILY MEDICINE

## 2022-01-03 PROCEDURE — 87653 STREP B DNA AMP PROBE: CPT | Performed by: FAMILY MEDICINE

## 2022-01-03 PROCEDURE — 91300 COVID-19,PF,PFIZER (12+ YRS): CPT | Performed by: FAMILY MEDICINE

## 2022-01-03 PROCEDURE — 99207 PR PRENATAL VISIT: CPT | Performed by: FAMILY MEDICINE

## 2022-01-03 ASSESSMENT — MIFFLIN-ST. JEOR: SCORE: 1629.46

## 2022-01-03 NOTE — PATIENT INSTRUCTIONS
[facility] Information  If you have any further concerns or wish to schedule another appointment, please call our office at [phone].  Call your doctor if you experience any bleeding or abdominal cramping early in pregnancy.     For uncomplicated pregnancies, you will be seeing your doctor once a month until you are 28 weeks, then you will see your doctor twice a month. You will begin weekly visits at 36 weeks until you deliver.     If you have a medical emergency, please call 911.    [facility] Labor and Delivery: [phone]  [facility] Labor and Delivery: [phone]    When to call or come in to the hospital    If you notice a decrease in your baby's movement.     If your begin to experience contractions that are 5 minutes or less apart and lasting for over 45 seconds, or if contractions are becoming more painful.    If you have any bleeding or leakage of fluids.     If you have a headache not resolved with tylenol, right upper abdominal pain, or sudden onset of swelling.    You know your body best. Never hesitate to call or go to the hospital if something doesn't feel right!    Preparing for the hospital  You re likely feeling anxious as your child s birth approaches. This is normal. To give yourself some peace of mind, pack a bag 3-4 weeks before your due date. Here is a list of things to remember:    Personal care items like toothbrush, hair brush, lip balm, lotion, shampoo, glasses, contacts    Nightgown, bathrobe, slippers    Several pairs of underwear    Comfortable clothes for you to wear home    Camera with new batteries    Cell phone and     Insurance information and any other paperwork needed for your hospital stay    Clothes for baby to wear home    An infant, rear-facing car seat for bringing home your baby (this is required by law)  What Is Group B Strep?   Group B strep (streptococcus) is a common bacteria. It can grow in a woman s vagina, rectum, or urinary tract. It is  almost always harmless in adults. But in rare cases, a woman who has group B strep can infect her baby during the birth. Infection can cause serious illness in the . The good news is that treating the mother during labor reduces the risk of the baby becoming infected. And if a  is infected, the infection can be treated. You will be screened for Group B strep at 35-36 weeks gestation.  Facts about group B strep   Learning more about group B strep can help you understand how testing and treatment can help. Here are some basic facts about group B strep:     It is not a sexually transmitted disease.     It is not the same as strep throat. (This is caused by group A strep.)     It often has no symptoms and may cause no problems in adults.     Group B strep can be transmitted during vaginal delivery. It cannot be passed during  (surgical) birth.     A mother with group B strep rarely infects her . (Infection occurs only about 1% to 2% of the time.)     When a mother is treated during labor and delivery, her baby almost never becomes infected.     Certain factors during pregnancy increase the risk of a baby becoming infected.  Possible effects on your baby   Group B strep can infect the blood. It can also cause inflammation of the baby s lungs, brain, or spinal cord. Long-term effects can include blindness, deafness, mental retardation, or cerebral palsy. And in rare cases, infection causes death. Infection is most often detected soon after the baby is born.   How your baby may become infected   Group B strep often lives in the vagina or rectum. If the amniotic sac breaks early, bacteria from the vagina can travel to the uterus, reaching the baby. Or, as the baby passes through the birth canal, it can come in contact with the bacteria. In rare cases, group B strep can also be passed to the baby after delivery. This is called late-onset group B strep. The source of this type of infection is not  well understood. But some experts believe that it happens if the baby is exposed to group B strep in the home, from the parents or siblings, or in the community.   What increases the risk?   Certain risk factors increase the chance that a baby will be infected. They include:     Breaking or leaking of the amniotic sac earlier than 37 weeks gestation     Labor earlier than 37 weeks gestation     Breaking of the amniotic sac more than 18 hours before labor begins     Fever during labor     A urinary tract infection with group B strep at any point in the pregnancy     A previous baby born with a group B strep infection    BaBaby Feeding in the Hospital: Information, Support and      Resources    As As you prepare for the birth of your child, you will want to consider options for feeding your baby cluding breast-feeding and/or baby formula. The American Academy of Pediatrics recommends exclusive breast-feeding for the first six months (although any amount of breast-feeding is beneficial).  However, we also understand that breast-feeding is  a personal choice and not for everyone. Whether or not you choose to breast-feed, your decision will be respected by our staff.        There are numerous benefits of breast-feeding; here are a few to consider:    Provides antibodies to protect your baby from infections and diseases    The cost: formula can cost over $1,500 per year    Convenience, no warming up or sterilizing bottles and supplies    The physical contact with breastfeeding can make babies feel secure, warm and comforted        What ever my feeding choice, what can I expect after I deliver my baby?    Your baby will usually be placed skin-to-skin immediately following birth. The skin to skin contact between you and your baby will be a special and memorable time. The bonding and attachment comforts your baby and has a positive effect on baby s brain development.     Having your baby  room in  with you also helps you  start to learn your baby s body rhythms and sleep cycle.      You will also begin to learn your baby s cues (signals) that he or she is ready to feed.        When do I start to feed my baby?         As soon as possible after your baby s birth, you will be encouraged to begin feeding. In the first couple of weeks, your baby will eat often. Breastfeeding babies usually eat at least 8 times in 24 hours. Babies fed formula usually eat at least 7 times in 24 hours.           Breast-feeding tips:    Get comfortable and use pillows for support.    Have your baby at the level of your breast, facing you,  tummy to tummy.       Touch your nipple to your baby s lips so your baby s mouth opens wide (rooting reflex).  Aim the nipple toward the roof of your baby s mouth. When your baby opens his or her mouth, pull your baby toward your breast to help your baby  latch on  to your nipple and much of the areola area.    Hand expressing your breast milk can assist with latching your baby to your breast, if needed.    Ask for help, breastfeeding may seem awkward or uncomfortable at first, this is normal. There are numerous resources available at [facility], clinics and beyond.     If your goal is to exclusively breastfeed, avoid using any formula or artificial nipples (including bottles and pacifiers) while you and your baby are learning to breastfeed unless there is a medical reason.       Mixing breastfeeding and formula can interfere with how you begin building your milk supply. It can impact how you and your baby learn to breastfeeding together and alter the natural growth of  good  bacteria in your baby s stomach.  Breast-feeding tips (cont.)    Delay a pacifier or a bottle in the first few weeks until breastfeeding is well established. This is often around 3 weeks of age.    Ask your nurse to show you how to hand express. Breast milk can be kept in the refrigerator orfreezer for later use.     [facility]  Resources:  [facility] Lactation Clinics located at [facility], [facility] and [facility]  Call: [phone].    Inpatient support    Outpatient appointments    Telephone consultation    Breast-feeding classes available through [employer]      Online Resources:    [email or web address] sign up for free online weekly e-mail    [email or web address]    Breastfeedingmadesimple.[email or web address]    li.Discretix (La Leche League)    Normalfed.com    [email or web address]/breastfeeding    Workandpump.com    Breast-feeding Supplies & Pumps:  Talk to your insurance provider or WIC (Women, Infants and Children) to learn more about options available to you. Recent health insurance changes may include additional coverage for supplies and pumps.    Public Health:  Women, Infants and Children Nutrition program (WIC): provides breast-feeding support and education in addition to formal feeding moms. 630-UYL-9258 or [email or web address]    Family Health Home Visiting: Public [facility] Nurse home visits are available. Talk to your provider to see if you qualify. Most [facility] have a program available.    Additional Resources:  La Leche League is an international, nonprofit, nonsectarian organization offering information, education, and support to mothers who want to breast-feed their babies. Local groups offer phone help and monthly meetings. Visit [employer]Discretix or [employer].Discretix and us the  Find local support  drop down menu or click on the  Resources  tab.    [place] Breastfeeding Resources: 8-118-155-BABY (4896) toll free    National Breastfeeding Help Line trained breastfeeding peer counselors can help answer common breast-feeding questions by phone. Monday-Friday: English/Bruneian  1-551- 553-9613 toll free, 1-537.301.9667 (TTY)    Care Connection: 792-638-[facility] (2664)

## 2022-01-03 NOTE — PROGRESS NOTES
"Melissa Rosario is a 26 year old  female who presents to clinic for a follow up OB visit. She is currently 36w3d with an estimated date of delivery of 2022 via 6-week ultrasound. She denies headache, chest pain, shortness of breath, abdominal pain/contractions, vaginal bleeding, or abnormal discharge. She continues to feel baby move.     New concerns today: Would like Covid booster today.  Wondering about placental donation.  Planning for natural birth for now but may be interested in options during active labor.    OB History    Para Term  AB Living   1 0 0 0 0 0   SAB IAB Ectopic Multiple Live Births   0 0 0 0 0      # Outcome Date GA Lbr Khari/2nd Weight Sex Delivery Anes PTL Lv   1 Current                Physical exam:  /71   Pulse 97   Temp 97.8  F (36.6  C) (Oral)   Ht 1.753 m (5' 9\")   Wt 83 kg (183 lb)   LMP  (LMP Unknown)   BMI 27.02 kg/m      General: alert, female in no acute distress  Abdomen: gravid uterus appropriate for gestation age at 35 cm above pubic symphysis, non-tender, FHTs 145  Extremities: no edema    Prenatal labs  Blood type: A+  Hgb: 14-->13.9  Pap: 3/26/219 nml  Hep B, HIV, syphilis x2, gonorrhea, chlamydia, HIV negative  Rubella immune  UA/Ucx normal  NIPS nml, boy!  Desires circumcision in clinic  Nml 1 hr GTT  Influenza given 2021  Tdap given 2021  Plans to breastfeed  Desires natural labor for now    Assessment/Plan:  1. Encounter for supervision of normal first pregnancy in third trimester  Reviewed expectations for final month of pregnancy and when to call/come in.  Has been measuring slightly below expected so ultrasound was obtained and showed appropriate growth, EFW 63%ile.   COVID-19 booster given today.  Considering placental donation.   Group B strep discussed and culture collected.   Discussed pre-term labor signs and symptoms and when to call/come in.     Discussed labor pain options:    Follow up in 1 week for routine prenatal " visit.       Isabel Beckham, DO

## 2022-01-04 LAB
GP B STREP DNA SPEC QL NAA+PROBE: NEGATIVE
PATIENT PENICILLIN, AMOXICILLIN, CEPHALOSPORINS ALLERGY: NO

## 2022-01-10 ENCOUNTER — PRENATAL OFFICE VISIT (OUTPATIENT)
Dept: FAMILY MEDICINE | Facility: CLINIC | Age: 27
End: 2022-01-10
Payer: COMMERCIAL

## 2022-01-10 VITALS
WEIGHT: 184.2 LBS | HEART RATE: 90 BPM | SYSTOLIC BLOOD PRESSURE: 117 MMHG | DIASTOLIC BLOOD PRESSURE: 67 MMHG | BODY MASS INDEX: 27.2 KG/M2 | TEMPERATURE: 97.5 F | RESPIRATION RATE: 16 BRPM

## 2022-01-10 DIAGNOSIS — Z34.03 ENCOUNTER FOR SUPERVISION OF NORMAL FIRST PREGNANCY IN THIRD TRIMESTER: Primary | ICD-10-CM

## 2022-01-10 PROCEDURE — 99207 PR PRENATAL VISIT: CPT | Performed by: FAMILY MEDICINE

## 2022-01-10 NOTE — PATIENT INSTRUCTIONS
[facility] Information  If you have any further concerns or wish to schedule another appointment, please call our office at [phone].  Call your doctor if you experience any bleeding or abdominal cramping early in pregnancy.     For uncomplicated pregnancies, you will be seeing your doctor once a month until you are 28 weeks, then you will see your doctor twice a month. You will begin weekly visits at 36 weeks until you deliver.     If you have a medical emergency, please call 911.    [facility] Labor and Delivery: [phone]  [facility] Labor and Delivery: [phone]    When to call or come in to the hospital    If you notice a decrease in your baby's movement.     If your begin to experience contractions that are 5 minutes or less apart and lasting for over 45 seconds, or if contractions are becoming more painful.    If you have any bleeding or leakage of fluids.     If you have a headache not resolved with tylenol, right upper abdominal pain, or sudden onset of swelling.    You know your body best. Never hesitate to call or go to the hospital if something doesn't feel right!    Preparing for the hospital  You re likely feeling anxious as your child s birth approaches. This is normal. To give yourself some peace of mind, pack a bag 3-4 weeks before your due date. Here is a list of things to remember:    Personal care items like toothbrush, hair brush, lip balm, lotion, shampoo, glasses, contacts    Nightgown, bathrobe, slippers    Several pairs of underwear    Comfortable clothes for you to wear home    Camera with new batteries    Cell phone and     Insurance information and any other paperwork needed for your hospital stay    Clothes for baby to wear home    An infant, rear-facing car seat for bringing home your baby (this is required by law)

## 2022-01-10 NOTE — PROGRESS NOTES
Melissa Rosario is a 26 year old  female who presents to clinic for a follow up OB visit. She is currently 37w3d with an estimated date of delivery of 2022 via 6-week ultrasound. She denies headache, chest pain, shortness of breath, abdominal pain/contractions, vaginal bleeding, or abnormal discharge. She continues to feel baby move.     New concerns today: none    OB History    Para Term  AB Living   1 0 0 0 0 0   SAB IAB Ectopic Multiple Live Births   0 0 0 0 0      # Outcome Date GA Lbr Khari/2nd Weight Sex Delivery Anes PTL Lv   1 Current                Physical exam:  /67 (BP Location: Left arm, Patient Position: Sitting, Cuff Size: Adult Regular)   Pulse 90   Temp 97.5  F (36.4  C) (Oral)   Resp 16   Wt 83.6 kg (184 lb 3.2 oz)   LMP  (LMP Unknown)   BMI 27.20 kg/m      General: alert, female in no acute distress  Abdomen: gravid uterus appropriate for gestation age at 35 cm above pubic symphysis, non-tender, FHTs 145, bedside ultrasound reveals vertex  Gyn: Closed thick and high  Extremities: no edema    Prenatal labs  Blood type: A+  Hgb: 14-->13.9  Pap: 3/26/219 nml  Hep B, HIV, syphilis x2, gonorrhea, chlamydia, HIV negative  Rubella immune  UA/Ucx normal  NIPS nml, boy!  Desires circumcision in clinic  Nml 1 hr GTT  Influenza given 2021  Tdap given 2021  Plans to breastfeed  Desires natural labor for now  GBS negative    Assessment/Plan:  1. Encounter for supervision of normal first pregnancy in third trimester  GBS negative. Will not need penicillin in labor.   Reviewed signs/symptoms of labor and when to present to the hospital.     Follow up in 1 week for routine prenatal visit, sooner if labor symptoms.       Isabel Beckham DO

## 2022-01-17 ENCOUNTER — PRENATAL OFFICE VISIT (OUTPATIENT)
Dept: FAMILY MEDICINE | Facility: CLINIC | Age: 27
End: 2022-01-17
Payer: COMMERCIAL

## 2022-01-17 VITALS
HEART RATE: 81 BPM | BODY MASS INDEX: 27.55 KG/M2 | SYSTOLIC BLOOD PRESSURE: 114 MMHG | TEMPERATURE: 97.3 F | HEIGHT: 69 IN | DIASTOLIC BLOOD PRESSURE: 67 MMHG | WEIGHT: 186 LBS

## 2022-01-17 DIAGNOSIS — Z34.03 ENCOUNTER FOR SUPERVISION OF NORMAL FIRST PREGNANCY IN THIRD TRIMESTER: Primary | ICD-10-CM

## 2022-01-17 DIAGNOSIS — O26.843 UTERINE SIZE DATE DISCREPANCY PREGNANCY, THIRD TRIMESTER: ICD-10-CM

## 2022-01-17 PROCEDURE — 99207 PR PRENATAL VISIT: CPT | Performed by: FAMILY MEDICINE

## 2022-01-17 ASSESSMENT — MIFFLIN-ST. JEOR: SCORE: 1643.07

## 2022-01-17 NOTE — PATIENT INSTRUCTIONS
[facility] Information  If you have any further concerns or wish to schedule another appointment, please call our office at [phone].  Call your doctor if you experience any bleeding or abdominal cramping early in pregnancy.     For uncomplicated pregnancies, you will be seeing your doctor once a month until you are 28 weeks, then you will see your doctor twice a month. You will begin weekly visits at 36 weeks until you deliver.     If you have a medical emergency, please call 911.    [facility] Labor and Delivery: [phone]  [facility] Labor and Delivery: [phone]    When to call or come in to the hospital    If you notice a decrease in your baby's movement.     If your begin to experience contractions that are 5 minutes or less apart and lasting for over 45 seconds, or if contractions are becoming more painful.    If you have any bleeding or leakage of fluids.     If you have a headache not resolved with tylenol, right upper abdominal pain, or sudden onset of swelling.    You know your body best. Never hesitate to call or go to the hospital if something doesn't feel right!    Preparing for the hospital  You re likely feeling anxious as your child s birth approaches. This is normal. To give yourself some peace of mind, pack a bag 3-4 weeks before your due date. Here is a list of things to remember:    Personal care items like toothbrush, hair brush, lip balm, lotion, shampoo, glasses, contacts    Nightgown, bathrobe, slippers    Several pairs of underwear    Comfortable clothes for you to wear home    Camera with new batteries    Cell phone and     Insurance information and any other paperwork needed for your hospital stay    Clothes for baby to wear home    An infant, rear-facing car seat for bringing home your baby (this is required by law)

## 2022-01-17 NOTE — PROGRESS NOTES
"Melissa Rosario is a 26 year old  female who presents to clinic for a follow up OB visit. She is currently 38w3d with an estimated date of delivery of 2022 via 6-week ultrasound. She denies headache, chest pain, shortness of breath, abdominal pain/contractions, vaginal bleeding, or abnormal discharge. She continues to feel baby move.     New concerns today: None    OB History    Para Term  AB Living   1 0 0 0 0 0   SAB IAB Ectopic Multiple Live Births   0 0 0 0 0      # Outcome Date GA Lbr Khari/2nd Weight Sex Delivery Anes PTL Lv   1 Current                Physical exam:  /67   Pulse 81   Temp 97.3  F (36.3  C) (Oral)   Ht 1.753 m (5' 9\")   Wt 84.4 kg (186 lb)   LMP  (LMP Unknown)   BMI 27.47 kg/m      General: alert, female in no acute distress  Abdomen: gravid uterus appropriate for gestation age at 35 cm above pubic symphysis, non-tender, FHTs 145, Leopold's maneuver reveals vertex positioning  Gyn: Closed thick and high  Extremities: no edema    Prenatal labs  Blood type: A+  Hgb: 14-->13.9  Pap: 3/26/219 nml  Hep B, HIV, syphilis x2, gonorrhea, chlamydia, HIV negative  Rubella immune  UA/Ucx normal  NIPS nml, boy!  Desires circumcision in clinic  Nml 1 hr GTT  Influenza given 2021  Tdap given 2021  Plans to breastfeed  Desires natural labor for now  GBS negative    Assessment/Plan:  1. Encounter for supervision of normal first pregnancy in third trimester  GBS negative. Will not need penicillin in labor.   Reviewed signs/symptoms of labor and when to present to the hospital.     2. Uterine size date discrepancy pregnancy, third trimester  Difficult to measure accurate uterine size. Prior growth ultrasound 1 month ago with with normal EFW of 63%.  However, since no notable change in uterine size, will repeat growth ultrasound.  - US OB > 14 Weeks; Future    Follow up in 1 week for routine prenatal visit, sooner if labor symptoms.       Isabel Beckham, DO    "

## 2022-01-19 ENCOUNTER — HOSPITAL ENCOUNTER (OUTPATIENT)
Dept: ULTRASOUND IMAGING | Facility: HOSPITAL | Age: 27
Discharge: HOME | End: 2022-01-19
Attending: FAMILY MEDICINE | Admitting: FAMILY MEDICINE
Payer: COMMERCIAL

## 2022-01-19 DIAGNOSIS — O26.843 UTERINE SIZE DATE DISCREPANCY PREGNANCY, THIRD TRIMESTER: ICD-10-CM

## 2022-01-19 PROCEDURE — 76816 OB US FOLLOW-UP PER FETUS: CPT

## 2022-01-23 ENCOUNTER — NURSE TRIAGE (OUTPATIENT)
Dept: NURSING | Facility: CLINIC | Age: 27
End: 2022-01-23
Payer: COMMERCIAL

## 2022-01-23 ENCOUNTER — HOSPITAL ENCOUNTER (INPATIENT)
Facility: HOSPITAL | Age: 27
LOS: 3 days | Discharge: HOME | End: 2022-01-26
Attending: FAMILY MEDICINE | Admitting: FAMILY MEDICINE
Payer: COMMERCIAL

## 2022-01-23 DIAGNOSIS — Z3A.39 39 WEEKS GESTATION OF PREGNANCY: Primary | ICD-10-CM

## 2022-01-23 PROBLEM — Z34.90 PREGNANCY: Status: ACTIVE | Noted: 2022-01-23

## 2022-01-23 PROBLEM — Z36.89 ENCOUNTER FOR TRIAGE IN PREGNANT PATIENT: Status: ACTIVE | Noted: 2022-01-23

## 2022-01-23 LAB
ABO/RH(D): NORMAL
HOLD SPECIMEN: NORMAL
SARS-COV-2 RNA RESP QL NAA+PROBE: NEGATIVE
SPECIMEN EXPIRATION DATE: NORMAL

## 2022-01-23 PROCEDURE — 87635 SARS-COV-2 COVID-19 AMP PRB: CPT | Performed by: FAMILY MEDICINE

## 2022-01-23 PROCEDURE — 86901 BLOOD TYPING SEROLOGIC RH(D): CPT | Performed by: FAMILY MEDICINE

## 2022-01-23 PROCEDURE — 250N000013 HC RX MED GY IP 250 OP 250 PS 637: Performed by: FAMILY MEDICINE

## 2022-01-23 PROCEDURE — 258N000003 HC RX IP 258 OP 636: Performed by: FAMILY MEDICINE

## 2022-01-23 PROCEDURE — 120N000001 HC R&B MED SURG/OB

## 2022-01-23 PROCEDURE — 36415 COLL VENOUS BLD VENIPUNCTURE: CPT | Performed by: FAMILY MEDICINE

## 2022-01-23 PROCEDURE — 250N000011 HC RX IP 250 OP 636: Performed by: FAMILY MEDICINE

## 2022-01-23 RX ORDER — NALOXONE HYDROCHLORIDE 0.4 MG/ML
0.2 INJECTION, SOLUTION INTRAMUSCULAR; INTRAVENOUS; SUBCUTANEOUS
Status: DISCONTINUED | OUTPATIENT
Start: 2022-01-23 | End: 2022-01-24 | Stop reason: HOSPADM

## 2022-01-23 RX ORDER — IBUPROFEN 600 MG/1
600 TABLET, FILM COATED ORAL
Status: DISCONTINUED | OUTPATIENT
Start: 2022-01-23 | End: 2022-01-26 | Stop reason: HOSPADM

## 2022-01-23 RX ORDER — PROCHLORPERAZINE 25 MG
25 SUPPOSITORY, RECTAL RECTAL EVERY 12 HOURS PRN
Status: DISCONTINUED | OUTPATIENT
Start: 2022-01-23 | End: 2022-01-24 | Stop reason: HOSPADM

## 2022-01-23 RX ORDER — MISOPROSTOL 200 UG/1
400 TABLET ORAL
Status: DISCONTINUED | OUTPATIENT
Start: 2022-01-23 | End: 2022-01-24 | Stop reason: HOSPADM

## 2022-01-23 RX ORDER — CARBOPROST TROMETHAMINE 250 UG/ML
250 INJECTION, SOLUTION INTRAMUSCULAR
Status: DISCONTINUED | OUTPATIENT
Start: 2022-01-23 | End: 2022-01-24 | Stop reason: HOSPADM

## 2022-01-23 RX ORDER — KETOROLAC TROMETHAMINE 30 MG/ML
30 INJECTION, SOLUTION INTRAMUSCULAR; INTRAVENOUS
Status: DISCONTINUED | OUTPATIENT
Start: 2022-01-23 | End: 2022-01-26 | Stop reason: HOSPADM

## 2022-01-23 RX ORDER — METOCLOPRAMIDE 10 MG/1
10 TABLET ORAL EVERY 6 HOURS PRN
Status: DISCONTINUED | OUTPATIENT
Start: 2022-01-23 | End: 2022-01-24 | Stop reason: HOSPADM

## 2022-01-23 RX ORDER — METHYLERGONOVINE MALEATE 0.2 MG/ML
200 INJECTION INTRAVENOUS
Status: DISCONTINUED | OUTPATIENT
Start: 2022-01-23 | End: 2022-01-24 | Stop reason: HOSPADM

## 2022-01-23 RX ORDER — FENTANYL CITRATE 50 UG/ML
50-100 INJECTION, SOLUTION INTRAMUSCULAR; INTRAVENOUS
Status: DISCONTINUED | OUTPATIENT
Start: 2022-01-23 | End: 2022-01-24 | Stop reason: HOSPADM

## 2022-01-23 RX ORDER — METOCLOPRAMIDE HYDROCHLORIDE 5 MG/ML
10 INJECTION INTRAMUSCULAR; INTRAVENOUS EVERY 6 HOURS PRN
Status: DISCONTINUED | OUTPATIENT
Start: 2022-01-23 | End: 2022-01-24 | Stop reason: HOSPADM

## 2022-01-23 RX ORDER — ONDANSETRON 2 MG/ML
4 INJECTION INTRAMUSCULAR; INTRAVENOUS EVERY 6 HOURS PRN
Status: DISCONTINUED | OUTPATIENT
Start: 2022-01-23 | End: 2022-01-24 | Stop reason: HOSPADM

## 2022-01-23 RX ORDER — NALOXONE HYDROCHLORIDE 0.4 MG/ML
0.4 INJECTION, SOLUTION INTRAMUSCULAR; INTRAVENOUS; SUBCUTANEOUS
Status: DISCONTINUED | OUTPATIENT
Start: 2022-01-23 | End: 2022-01-24 | Stop reason: HOSPADM

## 2022-01-23 RX ORDER — OXYTOCIN 10 [USP'U]/ML
10 INJECTION, SOLUTION INTRAMUSCULAR; INTRAVENOUS
Status: DISCONTINUED | OUTPATIENT
Start: 2022-01-23 | End: 2022-01-24 | Stop reason: HOSPADM

## 2022-01-23 RX ORDER — OXYTOCIN/0.9 % SODIUM CHLORIDE 30/500 ML
100-340 PLASTIC BAG, INJECTION (ML) INTRAVENOUS CONTINUOUS PRN
Status: DISCONTINUED | OUTPATIENT
Start: 2022-01-23 | End: 2022-01-26 | Stop reason: HOSPADM

## 2022-01-23 RX ORDER — MORPHINE SULFATE 10 MG/ML
10 INJECTION, SOLUTION INTRAMUSCULAR; INTRAVENOUS EVERY 4 HOURS PRN
Status: DISCONTINUED | OUTPATIENT
Start: 2022-01-23 | End: 2022-01-26 | Stop reason: HOSPADM

## 2022-01-23 RX ORDER — PROCHLORPERAZINE MALEATE 10 MG
10 TABLET ORAL EVERY 6 HOURS PRN
Status: DISCONTINUED | OUTPATIENT
Start: 2022-01-23 | End: 2022-01-24 | Stop reason: HOSPADM

## 2022-01-23 RX ORDER — MISOPROSTOL 200 UG/1
800 TABLET ORAL
Status: DISCONTINUED | OUTPATIENT
Start: 2022-01-23 | End: 2022-01-24 | Stop reason: HOSPADM

## 2022-01-23 RX ORDER — OXYTOCIN 10 [USP'U]/ML
10 INJECTION, SOLUTION INTRAMUSCULAR; INTRAVENOUS
Status: DISCONTINUED | OUTPATIENT
Start: 2022-01-23 | End: 2022-01-26 | Stop reason: HOSPADM

## 2022-01-23 RX ORDER — HYDROXYZINE HYDROCHLORIDE 50 MG/1
100 TABLET, FILM COATED ORAL EVERY 4 HOURS PRN
Status: DISCONTINUED | OUTPATIENT
Start: 2022-01-23 | End: 2022-01-26 | Stop reason: HOSPADM

## 2022-01-23 RX ORDER — LIDOCAINE 40 MG/G
CREAM TOPICAL
Status: DISCONTINUED | OUTPATIENT
Start: 2022-01-23 | End: 2022-01-23 | Stop reason: HOSPADM

## 2022-01-23 RX ORDER — OXYTOCIN/0.9 % SODIUM CHLORIDE 30/500 ML
340 PLASTIC BAG, INJECTION (ML) INTRAVENOUS CONTINUOUS PRN
Status: DISCONTINUED | OUTPATIENT
Start: 2022-01-23 | End: 2022-01-24 | Stop reason: HOSPADM

## 2022-01-23 RX ORDER — ONDANSETRON 4 MG/1
4 TABLET, ORALLY DISINTEGRATING ORAL EVERY 6 HOURS PRN
Status: DISCONTINUED | OUTPATIENT
Start: 2022-01-23 | End: 2022-01-24 | Stop reason: HOSPADM

## 2022-01-23 RX ADMIN — SODIUM CHLORIDE, POTASSIUM CHLORIDE, SODIUM LACTATE AND CALCIUM CHLORIDE 1000 ML: 600; 310; 30; 20 INJECTION, SOLUTION INTRAVENOUS at 21:45

## 2022-01-23 RX ADMIN — MORPHINE SULFATE 10 MG: 10 INJECTION INTRAVENOUS at 22:00

## 2022-01-23 RX ADMIN — HYDROXYZINE HYDROCHLORIDE 100 MG: 50 TABLET, FILM COATED ORAL at 21:27

## 2022-01-23 ASSESSMENT — MIFFLIN-ST. JEOR: SCORE: 1643.07

## 2022-01-23 NOTE — TELEPHONE ENCOUNTER
"OB Triage Call      Is patient's OB/Midwife with the [facility] or [facility]? [facility]- Is patient's primary OB a Midwife? No- Proceed with triage.     Reason for call: contractions     Assessment: contractions since 9 am today. Currently contractions are 3-5 min apart for 2 hours. No vaginal bleeding. No gush or trickling of fluid. Baby moving well.     Plan: go to [facility] L&D now    Patient's primary OB Provider is [facility].    Per protocol recommendations Patient to be evaluated in L&D.  Patient plans to deliver at [facility] ([phone]).  Labor and Deliver notified of patient's pending arrival.  Report given to Tracey at 5:08pm.    Is patient's delivering hospital on divert? No      39w2d    Estimated Date of Delivery: 2022        OB History    Para Term  AB Living   1 0 0 0 0 0   SAB IAB Ectopic Multiple Live Births   0 0 0 0 0      # Outcome Date GA Lbr Khari/2nd Weight Sex Delivery Anes PTL Lv   1 Current                No results found for: GBS       Klarissa Esteban RN 22 4:57 PM  [facility] Nurse Advisor    Reason for Disposition    [1] First baby (primipara) AND [2] contractions < 6 minutes apart  AND [3] present 2 hours    Additional Information    Negative: Passed out (i.e., lost consciousness, collapsed and was not responding)    Negative: Shock suspected (e.g., cold/pale/clammy skin, too weak to stand, low BP, rapid pulse)    Negative: Difficult to awaken or acting confused (e.g., disoriented, slurred speech)    Negative: [1] SEVERE abdominal pain (e.g., excruciating) AND [2] constant AND [3] present > 1 hour    Negative: Severe bleeding (e.g., continuous red blood from vagina, or large blood clots)    Negative: Umbilical cord hanging out of the vagina (shiny, white, curled appearance, \"like telephone cord\")    Negative: Uncontrollable urge to push (i.e., feels like baby is coming out now)    Negative: Can see baby    " Negative: Sounds like a life-threatening emergency to the triager    Negative: Pregnant < 37 weeks (i.e., )    Negative: [1] Uncertain delivery date AND [2] possibly pregnant < 37 weeks (i.e., )    Protocols used: PREGNANCY - LABOR-A-AH

## 2022-01-24 ENCOUNTER — ANESTHESIA (OUTPATIENT)
Dept: OBGYN | Facility: HOSPITAL | Age: 27
End: 2022-01-24
Payer: COMMERCIAL

## 2022-01-24 ENCOUNTER — ANESTHESIA EVENT (OUTPATIENT)
Dept: OBGYN | Facility: HOSPITAL | Age: 27
End: 2022-01-24
Payer: COMMERCIAL

## 2022-01-24 PROCEDURE — 59400 OBSTETRICAL CARE: CPT | Mod: GC | Performed by: FAMILY MEDICINE

## 2022-01-24 PROCEDURE — 0KQM0ZZ REPAIR PERINEUM MUSCLE, OPEN APPROACH: ICD-10-PCS | Performed by: FAMILY MEDICINE

## 2022-01-24 PROCEDURE — 3E0R3BZ INTRODUCTION OF ANESTHETIC AGENT INTO SPINAL CANAL, PERCUTANEOUS APPROACH: ICD-10-PCS | Performed by: ANESTHESIOLOGY

## 2022-01-24 PROCEDURE — 722N000001 HC LABOR CARE VAGINAL DELIVERY SINGLE

## 2022-01-24 PROCEDURE — 250N000013 HC RX MED GY IP 250 OP 250 PS 637

## 2022-01-24 PROCEDURE — 258N000003 HC RX IP 258 OP 636: Performed by: FAMILY MEDICINE

## 2022-01-24 PROCEDURE — 250N000011 HC RX IP 250 OP 636: Performed by: FAMILY MEDICINE

## 2022-01-24 PROCEDURE — 00HU33Z INSERTION OF INFUSION DEVICE INTO SPINAL CANAL, PERCUTANEOUS APPROACH: ICD-10-PCS | Performed by: ANESTHESIOLOGY

## 2022-01-24 PROCEDURE — 370N000003 HC ANESTHESIA WARD SERVICE

## 2022-01-24 PROCEDURE — 250N000011 HC RX IP 250 OP 636: Performed by: ANESTHESIOLOGY

## 2022-01-24 PROCEDURE — 250N000009 HC RX 250: Performed by: FAMILY MEDICINE

## 2022-01-24 PROCEDURE — 120N000001 HC R&B MED SURG/OB

## 2022-01-24 PROCEDURE — 0UQMXZZ REPAIR VULVA, EXTERNAL APPROACH: ICD-10-PCS | Performed by: FAMILY MEDICINE

## 2022-01-24 RX ORDER — ONDANSETRON 2 MG/ML
4 INJECTION INTRAMUSCULAR; INTRAVENOUS EVERY 6 HOURS PRN
Status: DISCONTINUED | OUTPATIENT
Start: 2022-01-24 | End: 2022-01-24 | Stop reason: HOSPADM

## 2022-01-24 RX ORDER — MISOPROSTOL 200 UG/1
400 TABLET ORAL
Status: DISCONTINUED | OUTPATIENT
Start: 2022-01-24 | End: 2022-01-26 | Stop reason: HOSPADM

## 2022-01-24 RX ORDER — BISACODYL 10 MG
10 SUPPOSITORY, RECTAL RECTAL DAILY PRN
Status: DISCONTINUED | OUTPATIENT
Start: 2022-01-24 | End: 2022-01-26 | Stop reason: HOSPADM

## 2022-01-24 RX ORDER — ACETAMINOPHEN 325 MG/1
650 TABLET ORAL EVERY 4 HOURS PRN
Status: DISCONTINUED | OUTPATIENT
Start: 2022-01-24 | End: 2022-01-26 | Stop reason: HOSPADM

## 2022-01-24 RX ORDER — MODIFIED LANOLIN
OINTMENT (GRAM) TOPICAL
Status: DISCONTINUED | OUTPATIENT
Start: 2022-01-24 | End: 2022-01-26 | Stop reason: HOSPADM

## 2022-01-24 RX ORDER — LIDOCAINE 40 MG/G
CREAM TOPICAL
Status: DISCONTINUED | OUTPATIENT
Start: 2022-01-24 | End: 2022-01-24 | Stop reason: HOSPADM

## 2022-01-24 RX ORDER — METHYLERGONOVINE MALEATE 0.2 MG/ML
200 INJECTION INTRAVENOUS
Status: DISCONTINUED | OUTPATIENT
Start: 2022-01-24 | End: 2022-01-26 | Stop reason: HOSPADM

## 2022-01-24 RX ORDER — HYDROCORTISONE 2.5 %
CREAM (GRAM) TOPICAL 3 TIMES DAILY PRN
Status: DISCONTINUED | OUTPATIENT
Start: 2022-01-24 | End: 2022-01-26 | Stop reason: HOSPADM

## 2022-01-24 RX ORDER — OXYTOCIN 10 [USP'U]/ML
10 INJECTION, SOLUTION INTRAMUSCULAR; INTRAVENOUS
Status: DISCONTINUED | OUTPATIENT
Start: 2022-01-24 | End: 2022-01-26 | Stop reason: HOSPADM

## 2022-01-24 RX ORDER — OXYTOCIN/0.9 % SODIUM CHLORIDE 30/500 ML
1-24 PLASTIC BAG, INJECTION (ML) INTRAVENOUS CONTINUOUS
Status: DISCONTINUED | OUTPATIENT
Start: 2022-01-24 | End: 2022-01-24 | Stop reason: HOSPADM

## 2022-01-24 RX ORDER — IBUPROFEN 800 MG/1
800 TABLET, FILM COATED ORAL EVERY 6 HOURS PRN
Status: DISCONTINUED | OUTPATIENT
Start: 2022-01-24 | End: 2022-01-26 | Stop reason: HOSPADM

## 2022-01-24 RX ORDER — SODIUM CHLORIDE, SODIUM LACTATE, POTASSIUM CHLORIDE, CALCIUM CHLORIDE 600; 310; 30; 20 MG/100ML; MG/100ML; MG/100ML; MG/100ML
INJECTION, SOLUTION INTRAVENOUS CONTINUOUS PRN
Status: DISCONTINUED | OUTPATIENT
Start: 2022-01-24 | End: 2022-01-24 | Stop reason: HOSPADM

## 2022-01-24 RX ORDER — MISOPROSTOL 200 UG/1
800 TABLET ORAL
Status: DISCONTINUED | OUTPATIENT
Start: 2022-01-24 | End: 2022-01-26 | Stop reason: HOSPADM

## 2022-01-24 RX ORDER — ONDANSETRON 4 MG/1
4 TABLET, ORALLY DISINTEGRATING ORAL EVERY 6 HOURS PRN
Status: DISCONTINUED | OUTPATIENT
Start: 2022-01-24 | End: 2022-01-24 | Stop reason: HOSPADM

## 2022-01-24 RX ORDER — OXYTOCIN/0.9 % SODIUM CHLORIDE 30/500 ML
340 PLASTIC BAG, INJECTION (ML) INTRAVENOUS CONTINUOUS PRN
Status: DISCONTINUED | OUTPATIENT
Start: 2022-01-24 | End: 2022-01-26 | Stop reason: HOSPADM

## 2022-01-24 RX ORDER — EPHEDRINE SULFATE 50 MG/ML
5 INJECTION, SOLUTION INTRAMUSCULAR; INTRAVENOUS; SUBCUTANEOUS
Status: DISCONTINUED | OUTPATIENT
Start: 2022-01-24 | End: 2022-01-24 | Stop reason: HOSPADM

## 2022-01-24 RX ORDER — NALBUPHINE HYDROCHLORIDE 10 MG/ML
2.5-5 INJECTION, SOLUTION INTRAMUSCULAR; INTRAVENOUS; SUBCUTANEOUS EVERY 6 HOURS PRN
Status: DISCONTINUED | OUTPATIENT
Start: 2022-01-24 | End: 2022-01-26 | Stop reason: HOSPADM

## 2022-01-24 RX ORDER — BUPIVACAINE HYDROCHLORIDE 2.5 MG/ML
INJECTION, SOLUTION EPIDURAL; INFILTRATION; INTRACAUDAL PRN
Status: DISCONTINUED | OUTPATIENT
Start: 2022-01-24 | End: 2022-01-24

## 2022-01-24 RX ORDER — DOCUSATE SODIUM 100 MG/1
100 CAPSULE, LIQUID FILLED ORAL DAILY
Status: DISCONTINUED | OUTPATIENT
Start: 2022-01-24 | End: 2022-01-26 | Stop reason: HOSPADM

## 2022-01-24 RX ORDER — CARBOPROST TROMETHAMINE 250 UG/ML
250 INJECTION, SOLUTION INTRAMUSCULAR
Status: DISCONTINUED | OUTPATIENT
Start: 2022-01-24 | End: 2022-01-26 | Stop reason: HOSPADM

## 2022-01-24 RX ORDER — SODIUM CHLORIDE, SODIUM LACTATE, POTASSIUM CHLORIDE, CALCIUM CHLORIDE 600; 310; 30; 20 MG/100ML; MG/100ML; MG/100ML; MG/100ML
INJECTION, SOLUTION INTRAVENOUS CONTINUOUS
Status: DISCONTINUED | OUTPATIENT
Start: 2022-01-24 | End: 2022-01-26 | Stop reason: HOSPADM

## 2022-01-24 RX ADMIN — SODIUM CHLORIDE, POTASSIUM CHLORIDE, SODIUM LACTATE AND CALCIUM CHLORIDE 1000 ML: 600; 310; 30; 20 INJECTION, SOLUTION INTRAVENOUS at 03:20

## 2022-01-24 RX ADMIN — Medication 14 ML/HR: at 04:36

## 2022-01-24 RX ADMIN — IBUPROFEN 800 MG: 800 TABLET, FILM COATED ORAL at 16:22

## 2022-01-24 RX ADMIN — Medication: at 12:03

## 2022-01-24 RX ADMIN — IBUPROFEN 800 MG: 800 TABLET, FILM COATED ORAL at 23:44

## 2022-01-24 RX ADMIN — KETOROLAC TROMETHAMINE 30 MG: 30 INJECTION, SOLUTION INTRAMUSCULAR; INTRAVENOUS at 10:36

## 2022-01-24 RX ADMIN — Medication 2 MILLI-UNITS/MIN: at 09:53

## 2022-01-24 RX ADMIN — BUPIVACAINE HYDROCHLORIDE 10 ML: 2.5 INJECTION, SOLUTION EPIDURAL; INFILTRATION; INTRACAUDAL at 04:22

## 2022-01-24 RX ADMIN — DOCUSATE SODIUM 100 MG: 100 CAPSULE, LIQUID FILLED ORAL at 12:03

## 2022-01-24 RX ADMIN — SODIUM CHLORIDE, POTASSIUM CHLORIDE, SODIUM LACTATE AND CALCIUM CHLORIDE: 600; 310; 30; 20 INJECTION, SOLUTION INTRAVENOUS at 05:31

## 2022-01-24 ASSESSMENT — ACTIVITIES OF DAILY LIVING (ADL)
DOING_ERRANDS_INDEPENDENTLY_DIFFICULTY: NO
DIFFICULTY_COMMUNICATING: NO
FALL_HISTORY_WITHIN_LAST_SIX_MONTHS: NO
DIFFICULTY_EATING/SWALLOWING: NO
WALKING_OR_CLIMBING_STAIRS_DIFFICULTY: NO
WEAR_GLASSES_OR_BLIND: YES
VISION_MANAGEMENT: GLASSES
TOILETING_ISSUES: NO
DRESSING/BATHING_DIFFICULTY: NO
CONCENTRATING,_REMEMBERING_OR_MAKING_DECISIONS_DIFFICULTY: NO

## 2022-01-24 NOTE — ANESTHESIA POSTPROCEDURE EVALUATION
Patient: Melissa Rosario    Procedure: * No procedures listed *       Diagnosis:* No pre-op diagnosis entered *  Diagnosis Additional Information: No value filed.    Anesthesia Type:  Epidural    Note:  Disposition: Inpatient   Postop Pain Control: Uneventful            Sign Out: Well controlled pain   PONV: No   Neuro/Psych: Uneventful            Sign Out: Acceptable/Baseline neuro status   Airway/Respiratory: Uneventful            Sign Out: Acceptable/Baseline resp. status   CV/Hemodynamics: Uneventful            Sign Out: Acceptable CV status; No obvious hypovolemia; No obvious fluid overload   Other NRE: NONE   DID A NON-ROUTINE EVENT OCCUR? No    Event details/Postop Comments:  Patient recovering as expected from labor epidural. Discussed ongoing recovery with patient and answered all of her epidural analgesia questions. I encouraged her to reach out to our team if any additional questions or concerns were to arise.              Last vitals:  Vitals Value Taken Time   BP     Temp     Pulse     Resp     SpO2         Electronically Signed By: Osman Alonso MD  January 24, 2022  1:29 PM

## 2022-01-24 NOTE — PROGRESS NOTES
0900: Patient began pushing. Fetal station +2. SROM occurred within the last hour, unknown exact time, thick meconium fluid noted.  0950: Contractions have spaced out to 4-5 minutes. Verbal order received to start oxytocin at 2 mU for labor augmentation.  0953: Oxytocin started at 2 mU.  0956: Infant , delivery team called for delivery due to thick mec.  1000:  of infant male, suctioned with bulb, spontaneous crying. Apgars 9 & 9.  1007: Placenta delivered. QBL: 352

## 2022-01-24 NOTE — ANESTHESIA PROCEDURE NOTES
Epidural catheter Procedure Note    Pre-Procedure   Staff -        Anesthesiologist:  Lizzie Jose MD       Performed By: anesthesiologist       Location: OB       Procedure Start/Stop Times: 1/24/2022 4:17 AM and 1/24/2022 4:38 AM       Pre-Anesthestic Checklist: patient identified, IV checked, risks and benefits discussed, informed consent, monitors and equipment checked, pre-op evaluation, at physician/surgeon's request and post-op pain management  Timeout:       Correct Patient: Yes        Correct Procedure: Yes        Correct Site: Yes        Correct Position: Yes   Procedure Documentation  Procedure: epidural catheter       Diagnosis: Labor Pain       Patient Position: sitting       Skin prep: Betadine      Local skin infiltrated with mL of 1% lidocaine.        Insertion Site: L4-5. (midline approach).       Technique: LORT air        Needle Type: Touhy needle       Needle Gauge: 18.        Needle Length (Inches): 3.5        Catheter: 20 G.         Catheter threaded easily.         # of attempts: 1 and  # of redirects:  0    Assessment/Narrative         Paresthesias: No.      Test dose of 3 mL lidocaine 1.5% w/ 1:200,000 epinephrine at.         Test dose negative, 3 minutes after injection, for signs of intravascular, subdural, or intrathecal injection.       Insertion/Infusion Method: LORT air       Aspiration negative for Heme or CSF via Epidural Catheter.

## 2022-01-24 NOTE — PROGRESS NOTES
Dr. Beckham updated via phone, discussed sve-8 cm, FHR tracing, decelerations, and nursing interventions. MD at clinic and plans to come to hospital soon.

## 2022-01-24 NOTE — PROGRESS NOTES
"LABOR PROGRESS NOTE  2022 8:52 AM    SUBJECTIVE:  Feeling comfortable with epidural, finally getting some rest. Does feel a little bit of pressure with contractions, not a lot.     OBJECTIVE:  /73   Pulse 85   Temp 97.9  F (36.6  C) (Oral)   Resp 16   Ht 1.753 m (5' 9\")   Wt 84.4 kg (186 lb)   LMP  (LMP Unknown)   SpO2 95%   BMI 27.47 kg/m    FHR: Baseline: 130 bpm, Variability: Moderate (6 - 25 bpm), Accelerations: Present, Decelerations: occasional late decelerations  Uterine Contractions:  regular, every 3-4 minutes.  Cervix: dilation 10 cm , 100% effaced, soft, and +1 station.  Fluid: None noted. Brown bloody show present  Fetal Presentation: vertex.    ASSESSMENT & PLAN:   26 year old  at 39w3d presenting in active labor, now complete.     Will start pushing    Anticipate KARI Ervin MD  [facility], PGY-1  Pager #: [phone]    "

## 2022-01-24 NOTE — ANESTHESIA PREPROCEDURE EVALUATION
Anesthesia Pre-Procedure Evaluation    Patient: Melissa Rosario   MRN: [id number] : 1995        Preoperative Diagnosis: * No pre-op diagnosis entered *    Procedure : * No procedures listed *          No past medical history on file.   No past surgical history on file.   No Known Allergies   Social History     Tobacco Use     Smoking status: Never Smoker     Smokeless tobacco: Never Used   Substance Use Topics     Alcohol use: No     Alcohol/week: 0.0 standard drinks      Wt Readings from Last 1 Encounters:   22 84.4 kg (186 lb)        Anesthesia Evaluation   Pt has not had prior anesthetic         ROS/MED HX  ENT/Pulmonary:       Neurologic:       Cardiovascular:       METS/Exercise Tolerance:     Hematologic:       Musculoskeletal:       GI/Hepatic:       Renal/Genitourinary:       Endo:       Psychiatric/Substance Use:       Infectious Disease:       Malignancy:       Other:      (+) Possibly pregnant, ,         Physical Exam    Airway        Mallampati: II    Neck ROM: full     Respiratory Devices and Support         Dental  no notable dental history         Cardiovascular   cardiovascular exam normal          Pulmonary   pulmonary exam normal                OUTSIDE LABS:  CBC:   Lab Results   Component Value Date    WBC 8.0 2021    HGB 13.9 2021    HGB 14.0 2021    HCT 38.9 2021     2021     BMP: No results found for: NA, POTASSIUM, CHLORIDE, CO2, BUN, CR, GLC  COAGS: No results found for: PTT, INR, FIBR  POC:   Lab Results   Component Value Date    HCG Positive (A) 2021     HEPATIC: No results found for: ALBUMIN, PROTTOTAL, ALT, AST, GGT, ALKPHOS, BILITOTAL, BILIDIRECT, AIDE  OTHER: No results found for: PH, LACT, A1C, YUDELKA, PHOS, MAG, LIPASE, AMYLASE, TSH, T4, T3, CRP, SED    Anesthesia Plan    ASA Status:  2      Anesthesia Type: Epidural.              Consents    Anesthesia Plan(s) and associated risks, benefits, and realistic alternatives discussed.  Questions answered and patient/representative(s) expressed understanding.     - Discussed: Risks, Benefits and Alternatives for the PROCEDURE were discussed     - Discussed with:  Patient         Postoperative Care            Comments:                Lizzie Jose MD

## 2022-01-24 NOTE — H&P
"OB Admission H&P  Date: 2022  Time: 7:00 AM  Melissa Rosario,  1995, MRN [id number]    CC: contractions    HPI: Melissa Rosario is a 26 year old year old  at 39w3d weeks by 6-week ultrasound presenting with regular and painful contractions since 0900 2022.  She denies leakage of fluid and vaginal bleeding and reports good fetal movement.  Does endorse some mucus discharge.    Prenatal Complications: None    OB/Gyn History  OB History    Para Term  AB Living   1 0 0 0 0 0   SAB IAB Ectopic Multiple Live Births   0 0 0 0 0      # Outcome Date GA Lbr Khari/2nd Weight Sex Delivery Anes PTL Lv   1 Current                Physical Exam:  /53   Pulse 85   Temp 98.7  F (37.1  C) (Oral)   Resp 16   Ht 1.753 m (5' 9\")   Wt 84.4 kg (186 lb)   LMP  (LMP Unknown)   SpO2 97%   BMI 27.47 kg/m      Cervical Exam: 4-5/80/-2 per nursing exam  FHR: 130, min doris, +accels, -decels  [place]:  q4-7min    Prenatal Labs  Blood type: A+  GBS negative   Rubella immune, Hep B negative, HIV negative, RPR non-reactive   GC / CT negative   1 hr GCT passed     Impression:  Melissa Rosario is a 26 year old year old  at 39w3d weeks admitted for latent labor, otherwise uncomplicated pregnancy. Fetal status reassuring.  Appropriate cervical progression overnight with therapeutic rest.    Plan:     Admit to L&D, anticipate                                                        Plans to breastfeed    Pain control: Received morphine overnight, epidural now in place with appropriate pain control    Blood type: A+    GBS: negative    Expecting baby boy, plan for circumcision in clinic      Isabel Beckham,   2022, 7:00 AM      "

## 2022-01-24 NOTE — PROVIDER NOTIFICATION
Call received from Dr Beckham and will be in clinic and available when needed. MD updated with pt progress and status.

## 2022-01-24 NOTE — L&D DELIVERY NOTE
Delivery Summary    Melissa Rosario MRN# [id number]   Age: 26 year old YOB: 1995     ASSESSMENT & PLAN:     26 year old  at 39w3d presented with contractions starting at home, progressing to 3-5 minutes apart for several hours. No leakage of fluid. SVE on arrival 4-5 cm. She progressed over about 6 hours to complete with epidural anesthesia. SROM for thick meconium-stained fluid. She pushed for about an hour and baby delivered in MONSERRAT position without complication. Skin-to-skin was initiated. 3 vessel cord was cut and clamped after 1 minute. Spontaneous intact placenta was delivered over perineum with second-degree laceration and small right labial laceration, which were repaired. Fundus was noted to be firm with 250 ml blood loss. Pitocin was initiated after delivery. Routine post partum cares.       Lindy Ervin MD  [facility], PGY-1  Pager #: [phone]       Emy Rosario [[id number]]    Labor Event Times    Dilation complete date: 22 Complete time:  8:50 AM   Start pushing date/time: 2022 0900      Labor Events     labor?: No   steroids: None  Labor Type: Spontaneous  Predominate monitoring during 1st stage: continuous electronic fetal monitoring     Antibiotics received during labor?: No     Rupture identifier: Sac 1  Rupture date/time:     Rupture type: Spontaneous rupture of membranes occuring during spontaneous labor or augmentation  Fluid color: Meconium     Augmentation: Oxytocin  Indications for augmentation: Ineffective Contraction Pattern  1:1 continuous labor support provided by?: RN       Delivery/Placenta Date and Time    Delivery Date: 22 Delivery Time: 10:00 AM   Placenta Date/Time: 2022 10:07 AM  Oxytocin given at the time of delivery: after delivery of baby  Delivering clinician: Isabel Beckham DO   Other personnel present at delivery:  Provider Role   Prisca Hernandez, Lindy Vaughn MD     Carol Chavarria RN Brady, Katherine Ryan, DO Gutzman, Debra Ann, APRN CNP          Vaginal Counts     Initial count performed by 2 team members:  Two Team Members   lawrence millard       Needles Suture Needles Sponges (RETIRED) Instruments   Initial counts   5    Added to count  2     Relief counts       Final counts   5          Placed during labor Accounted for at the end of labor   FSE No NA   IUPC No NA   Cervadil NA NA              Final count performed by 2 team members:  Two Team Members   lawrence millard         Apgars    Living status: Living   1 Minute 5 Minute 10 Minute 15 Minute 20 Minute   Skin color: 1  1       Heart rate: 2  2       Reflex irritability: 2  2       Muscle tone: 2  2       Respiratory effort: 2  2       Total: 9  9       Apgars assigned by: ENEDINA     Cord    Vessels: 3 Vessels    Cord Complications: None               Cord Blood Disposition: Discard    Gases Sent?: No    Delayed cord clamping?: Yes    Cord Clamping Delay (seconds): 31-60 seconds    Stem cell collection?: No       [place] Resuscitation    Methods: None  [place] Care at Delivery:   [facility]  Procedure Note     Asked by Dr. Beckham,to attend the delivery of this 39 3/7 week term, male secondary to MSF, decel PTD.  Infant was born vaginally at 10:00 hours on 2022 in vertex presentation with spontaneous cry and respirations. Infant was placed on mothers abdomen for 60 seconds of delayed cord clamping. Infant wasremained on mothers chest, dried, stimulated and bulb suctioned by L & d staff.  Infant continued to be vigorous with strong cry, quickly becoming pink and well perfused. Infant required no resuscitation. Apgar scores were 9 and 9 at one and five minutes respectively. Exam was remarkable for wet breath sounds.     Infant remained with parents and  delivery staff.  KACIE Hargrove CNP on 2022 at 10:14 AM     Output in Delivery Room: Stool     [place] Measurements     Output in delivery room: Stool     Skin to Skin and Feeding Plan    Skin to skin initiation date/time: 1/1/1841    Skin to skin with: Mother  Skin to skin end date/time:        Labor Events and Shoulder Dystocia    Fetal Tracing Prior to Delivery: Category 2  Fetal Tracing Comments: heart rate in 80s for 2-3 minutes before last contraction/push to delivery  Shoulder dystocia present?: Neg     Delivery (Maternal) (Provider to Complete) ([id number])    Episiotomy: None  Perineal lacerations: 2nd Repaired?: Yes   Labial laceration: right Repaired?: Yes   Est. blood loss (mL): 250  Repair suture: 3-0 Vicryl  Number of repair packets: 2  Genital tract inspection done: Pos     Blood Loss  Mother: Melissa Rosario #[id number]   Start of Mother's Information    Delivery Blood Loss  01/23/22 2200 - 01/24/22 1034    EBL (mL) Hospital Encounter 250 mL    Total  250 mL         End of Mother's Information  Mother: Melissa Rosario #[id number]          Delivery - Provider to Complete ([id number])    Delivering clinician: Isabel Beckham DO  Attempted Delivery Types (Choose all that apply): Spontaneous Vaginal Delivery  Delivery Type (Choose the 1 that will go to the Birth History): Vaginal, Spontaneous                   Other personnel:  Provider Role   Prisca Hernandez RN [place], Kimberly, MD Dabruzzi, Jacquelyn M, RN Brady, Katherine Ryan, DO Gutzman, Debra Ann, APRN CNP                 Placenta    Date/Time: 1/24/2022 10:07 AM  Removal: Spontaneous  Disposition: Hospital disposal           Anesthesia    Method: Epidural                Presentation and Position    Presentation: Vertex    Position: Left Occiput Anterior                 Lindy Ervin MD

## 2022-01-24 NOTE — PLAN OF CARE
Problem: Adult Inpatient Plan of Care  Goal: Optimal Comfort and Wellbeing  Outcome: No Change  Intervention: Provide Person-Centered Care  Recent Flowsheet Documentation  Taken 1/23/2022 2210 by Katerina Grajeda RN  Trust Relationship/Rapport:   care explained   choices provided   emotional support provided   empathic listening provided   questions answered   questions encouraged   reassurance provided   thoughts/feelings acknowledged     Problem: Adult Inpatient Plan of Care  Goal: Plan of Care Review  Outcome: No Change  Flowsheets (Taken 1/23/2022 2239)  Plan of Care Reviewed With:   patient   spouse     Problem: Adult Inpatient Plan of Care  Goal: Patient-Specific Goal (Individualized)  Outcome: Improving  Flowsheets (Taken 1/23/2022 2239)  Individualized Care Needs: adequate rest with ctx   VSS, , moderate to minimal variability with 15X15 accels. IV fluid bolus was started and pt was given therapeutic sleep. Ctx are spacing now and pt is able to rest better between them. FOB is present and supportive. Instructed to call when awake as I will let her sleep. Oriented to POC and [facility] routines.

## 2022-01-24 NOTE — PROVIDER NOTIFICATION
Dr Beckham called with pt update, has had little flluids today and hasn't eaten. Provided with juice and judah crackers, drank approx 4 oz of apple juice and little water. Pt states ctx since 0900 today. Pt is ok with IV hydration and therapeutic sleep, doesn't feel she can drink adequate fluids at this time.

## 2022-01-24 NOTE — PROVIDER NOTIFICATION
Spoke to Dr. Beckham regarding her patient coming to triage and updating her.  She is keeping her for 2 hour and then we can do a cervical check and see if she makes change and call her with results.

## 2022-01-24 NOTE — PLAN OF CARE
Problem: Adult Inpatient Plan of Care  Goal: Absence of Hospital-Acquired Illness or Injury  1/24/2022 0604 by Katerina Grajeda RN  Outcome: No Change  1/23/2022 2239 by Katerina Grajeda RN  Outcome: No Change     Problem: Adult Inpatient Plan of Care  Goal: Optimal Comfort and Wellbeing  1/24/2022 0604 by Katerina Grajeda RN  Outcome: No Change  1/23/2022 2239 by Katerina Grajeda RN  Outcome: No Change  Intervention: Provide Person-Centered Care  Recent Flowsheet Documentation  Taken 1/23/2022 2210 by Katerina Grajeda RN  Trust Relationship/Rapport:   care explained   choices provided   emotional support provided   empathic listening provided   questions answered   questions encouraged   reassurance provided   thoughts/feelings acknowledged     Problem: Adult Inpatient Plan of Care  Goal: Optimal Comfort and Wellbeing  Intervention: Provide Person-Centered Care  Recent Flowsheet Documentation  Taken 1/23/2022 2210 by Katerina Grajeda RN  Trust Relationship/Rapport:   care explained   choices provided   emotional support provided   empathic listening provided   questions answered   questions encouraged   reassurance provided   thoughts/feelings acknowledged   VSS, medicated with Therapeutic sleep with adequate rest. Woke some times to change position, later woke due to no adequate relief. Epidural was explained and ordered, has had good relief from labor discomfort. FOB is loving and attentive. , moderate and sometimes minimal variability. Ctx are moderate and 2-7 min apart with a soft resting tone. Pt no longer feels ctx. Instructed on the sensations and efforts necessary for pushing and delivery.

## 2022-01-25 PROCEDURE — 250N000013 HC RX MED GY IP 250 OP 250 PS 637

## 2022-01-25 PROCEDURE — 99207 PR SUBSEQUENT HOSPITAL CARE FOR MOTHER, 15 MINUTES: CPT | Performed by: FAMILY MEDICINE

## 2022-01-25 PROCEDURE — 120N000001 HC R&B MED SURG/OB

## 2022-01-25 RX ADMIN — ACETAMINOPHEN 650 MG: 325 TABLET ORAL at 08:16

## 2022-01-25 RX ADMIN — IBUPROFEN 800 MG: 800 TABLET, FILM COATED ORAL at 20:56

## 2022-01-25 RX ADMIN — IBUPROFEN 800 MG: 800 TABLET, FILM COATED ORAL at 14:26

## 2022-01-25 RX ADMIN — ACETAMINOPHEN 650 MG: 325 TABLET ORAL at 20:56

## 2022-01-25 RX ADMIN — ACETAMINOPHEN 650 MG: 325 TABLET ORAL at 12:19

## 2022-01-25 RX ADMIN — IBUPROFEN 800 MG: 800 TABLET, FILM COATED ORAL at 08:22

## 2022-01-25 RX ADMIN — DOCUSATE SODIUM 100 MG: 100 CAPSULE, LIQUID FILLED ORAL at 08:17

## 2022-01-25 RX ADMIN — ACETAMINOPHEN 650 MG: 325 TABLET ORAL at 16:26

## 2022-01-25 NOTE — LACTATION NOTE
This note was copied from a baby's chart.  This writer met with Melissa per her request.  Infant has struggled to latch onto the breast.  Melissa told RN this morning that she spent one hour trying to latch infant onto the breast this morning with no success.  RN then helped hand express the breast and spoon fed infant 9 ml colostrum.  This writer notes that Melissa appears very tired, as she yawning frequently and is slow to move.  Education given on reverse pressure softening, hand expression, the importance of optimal positioning for deep, comfortable latch and effective milk transfer, listening for swallows, the importance of feeding baby on early hunger cues, and breastfeeding 8-12 times in 24 hours for optimal infant nutrition and hydration as well as for building an optimal milk supply.  She was encouraged to follow up at the Outpatient [facility]ation [facility] after discharge for any breastfeeding questions or concerns.  After education, this writer did reverse pressure softening and hand expression to soften the areolar tissue. Infant properly positioned in the football hold.  Infant opens up his mouth and takes in the breast tissue, but refuses to suck.  Several attempts made.  Melissa hand expressed and 3 ml colostrum given to infant to calm infant.  Infant to the breast again.  Infant will not suck.  Nipple shield was at the bedside.  This writer educated Melissa on the proper use and cleaning of the nipple shield.  She states she prefers not to use it but agreed to try to latch infant with it x 1.  Infant able to obtain a deep latch but would not suck.  Infant given chin support with no sucking seen.  After 10 minutes, feeding was ended and Melissa to hand express and feed infant the expressed colostrum, as he cues.  Melissa verbalizes understanding of all education given.  She denies any further questions.  RN updated.  Will try to latch infant next feeding and if no latch, hand express or pump breasts and feed  infant expressed colostrum.

## 2022-01-25 NOTE — PROGRESS NOTES
[facility] Obstetrics Post-Partum Progress Note          Assessment and Plan:    Assessment:   Post-partum day #1  Normal spontaneous vaginal delivery  L&D complications: None      Doing well.  No excessive bleeding  Pain well-controlled.      Plan:   Ambulation encouraged  Breast feeding strategies discussed  Pain control measures as needed  Anticipate discharge tomorrow           Interval History:   Doing well.  Pain is well-controlled.  No fevers.  No history of foul-smelling vaginal discharge.  Good appetite.  Denies chest pain, shortness of breath, nausea or vomiting.  Vaginal bleeding is similar to a heavy menstrual flow.  Ambulatory.  Breastfeeding is getting assistance.          Significant Problems:    None          Review of Systems:    The patient denies any chest pain, shortness of breath, excessive pain, fever, chills, purulent drainage from the wound, nausea or vomiting.          Medications:   All medications related to the patient's surgery have been reviewed          Physical Exam:   All vitals stable  Uterine fundus is firm, non-tender and at the level of the umbilicus          Data:           Gay Osuna MD

## 2022-01-25 NOTE — PROGRESS NOTES
Pt. Was advised that she should be getting up to try to empty her bladder and change her pads each time that she is up to feed baby.  She stated that she had not been out of bed for almost 6hrs to this point.  Pt. Sat at side of bed with legs dangling, denied dizziness/lightheadedness, stood without assistance and ambulated to the bathroom and did giana cares independently. Pt. Was educated on importance of keeping giana area clean and dry and the importance of motion after delivery.

## 2022-01-25 NOTE — PLAN OF CARE
Problem: Adult Inpatient Plan of Care  Goal: Absence of Hospital-Acquired Illness or Injury  Intervention: Prevent Skin Injury  Recent Flowsheet Documentation  Taken 1/25/2022 0252 by Cora Colorado RN  Body Position: sitting up in bed       Pt. Was educated on self care and keeping her bottom clean and dry.  Pt. Is breast feeding and using nipple shield on both sides due to flat nipples.

## 2022-01-26 VITALS
HEART RATE: 68 BPM | BODY MASS INDEX: 27.55 KG/M2 | SYSTOLIC BLOOD PRESSURE: 100 MMHG | TEMPERATURE: 98.2 F | WEIGHT: 186 LBS | RESPIRATION RATE: 18 BRPM | HEIGHT: 69 IN | DIASTOLIC BLOOD PRESSURE: 67 MMHG | OXYGEN SATURATION: 98 %

## 2022-01-26 PROCEDURE — 250N000013 HC RX MED GY IP 250 OP 250 PS 637

## 2022-01-26 PROCEDURE — 99238 HOSP IP/OBS DSCHRG MGMT 30/<: CPT | Performed by: FAMILY MEDICINE

## 2022-01-26 RX ORDER — ACETAMINOPHEN 325 MG/1
650 TABLET ORAL EVERY 4 HOURS PRN
Qty: 30 TABLET | Refills: 0 | Status: SHIPPED | OUTPATIENT
Start: 2022-01-26 | End: 2022-02-24

## 2022-01-26 RX ORDER — MODIFIED LANOLIN
OINTMENT (GRAM) TOPICAL
Qty: 7 G | Refills: 1 | Status: SHIPPED | OUTPATIENT
Start: 2022-01-26 | End: 2024-03-12

## 2022-01-26 RX ORDER — IBUPROFEN 800 MG/1
800 TABLET, FILM COATED ORAL EVERY 6 HOURS PRN
Qty: 30 TABLET | Refills: 0 | Status: SHIPPED | OUTPATIENT
Start: 2022-01-26 | End: 2022-02-24

## 2022-01-26 RX ADMIN — ACETAMINOPHEN 650 MG: 325 TABLET ORAL at 10:28

## 2022-01-26 RX ADMIN — IBUPROFEN 800 MG: 800 TABLET, FILM COATED ORAL at 06:16

## 2022-01-26 RX ADMIN — ACETAMINOPHEN 650 MG: 325 TABLET ORAL at 06:16

## 2022-01-26 RX ADMIN — DOCUSATE SODIUM 100 MG: 100 CAPSULE, LIQUID FILLED ORAL at 08:41

## 2022-01-26 NOTE — DISCHARGE SUMMARY
[facility] Discharge Summary    Melissa Rosario MRN# [id number]   Age: 26 year old YOB: 1995     Date of Admission:  2022  Date of Discharge::  {DISCHARGE DATE:442419  Admitting Physician:  Isabel Beckham DO  Discharge Physician:  Gay Osuna MD     Home clinic: [facility]          Admission Diagnoses:   Encounter for triage in pregnant patient [Z36.89]  Pregnancy [Z34.90]          Discharge Diagnosis:   Normal spontaneous vaginal delivery  Intrauterine pregnancy at 39 weeks gestation          Procedures:   Procedure(s): Repair of second degree perineal laceration       No procedures performed during this admission           Medications Prior to Admission:     Medications Prior to Admission   Medication Sig Dispense Refill Last Dose     prenatal vit no.130-iron-folic (PRENATAL VITAMIN) 27 mg iron- 800 mcg Tab tablet [PRENATAL VIT NO.130-IRON-FOLIC (PRENATAL VITAMIN) 27 MG IRON- 800 MCG TAB TABLET] Take 1 tablet by mouth daily. 90 tablet 3 2022 at Unknown time             Discharge Medications:     Current Discharge Medication List      START taking these medications    Details   acetaminophen (TYLENOL) 325 MG tablet Take 2 tablets (650 mg) by mouth every 4 hours as needed for mild pain or fever  Qty: 30 tablet, Refills: 0    Associated Diagnoses:  (normal spontaneous vaginal delivery)      benzocaine-menthol (DERMOPLAST) 20-0.5 % AERO Apply 1 g topically every 4 hours as needed (perineal pain)  Qty: 78 g, Refills: 0    Associated Diagnoses:  (normal spontaneous vaginal delivery)      ibuprofen (ADVIL/MOTRIN) 800 MG tablet Take 1 tablet (800 mg) by mouth every 6 hours as needed for other (cramping)  Qty: 30 tablet, Refills: 0    Associated Diagnoses:  (normal spontaneous vaginal delivery)      lanolin ointment Apply topically every hour as needed for other (sore nipples)  Qty: 7 g, Refills: 1    Associated Diagnoses:  (normal  spontaneous vaginal delivery)         CONTINUE these medications which have NOT CHANGED    Details   prenatal vit no.130-iron-folic (PRENATAL VITAMIN) 27 mg iron- 800 mcg Tab tablet [PRENATAL VIT NO.130-IRON-FOLIC (PRENATAL VITAMIN) 27 MG IRON- 800 MCG TAB TABLET] Take 1 tablet by mouth daily.  Qty: 90 tablet, Refills: 3    Associated Diagnoses: Normal first pregnancy confirmed, currently in first trimester                   Consultations:   No consultations were requested during this admission          Brief History of Labor:   See delivery summary           Hospital Course:   The patient's hospital course was unremarkable.  On discharge, her pain was well controlled.  Vaginal bleeding is similar to peak menstrual flow.  Voiding without difficulty.  Ambulating well and tolerating a normal diet.  No fever.  Breastfeeding/pumping well.  Latch has been problematic, but pumping and feeding baby well. Infant is stable.  No bowel movement yet.*  She was discharged on post-partum day #2 .    Post-partum hemoglobin:   Hemoglobin   Date Value Ref Range Status   11/17/2021 13.9 11.7 - 15.7 g/dL Final             Discharge Instructions and Follow-Up:   Discharge diet: Regular   Discharge activity: Activity as tolerated   Discharge follow-up: Follow up with primary care provider in 6 weeks   Wound care: Drink plenty of fluids  Ice to area for comfort           Discharge Disposition:   Discharged to home      Attestation:   Gay Osuna MD

## 2022-01-26 NOTE — PLAN OF CARE
Problem: Adult Inpatient Plan of Care  Goal: Optimal Comfort and Wellbeing  Outcome: Improving  Intervention: Provide Person-Centered Care  Recent Flowsheet Documentation  Taken 1/26/2022 0000 by Guillermina Reinoso RN  Trust Relationship/Rapport:   care explained   emotional support provided   questions answered   questions encouraged   thoughts/feelings acknowledged     Problem: Adjustment to Role Transition (Postpartum Vaginal Delivery)  Goal: Successful Maternal Role Transition  Outcome: Improving  Intervention: Support Maternal Role Transition  Recent Flowsheet Documentation  Taken 1/26/2022 0000 by Guillermina Reinoso RN  Supportive Measures:   active listening utilized   positive reinforcement provided   Melissa 's vitals and post-partum assessment are within normal limits. Pumping every 3 hours, getting large amount 30-35 ml . She denies pain Guillermina Reinoso, JUDY

## 2022-01-26 NOTE — PLAN OF CARE
Reviewed discharge instructions with patient and spouse. They state understanding. Discharged to home; ambulated to lobby with RN, spouse and baby.

## 2022-01-26 NOTE — LACTATION NOTE
Lactation to patient room to discuss feeding/pumping plan. Reviewed differences between personal and hospital grade pump for initiating and building milk supply for a mom who is exclusively pumping due to latch difficulty at this time. Mom made decision to rent symphony pump for at least the first 4 weeks. Reviewed when to move from initiate to maintain program as her milk supply increases. Pt. States understanding.  Instructed to call for outpatient lactation appointment to assist with latch difficulty. Mom states she has no questions at this time. Encouragement and support offered.

## 2022-01-27 ENCOUNTER — TELEPHONE (OUTPATIENT)
Dept: FAMILY MEDICINE | Facility: CLINIC | Age: 27
End: 2022-01-27
Payer: COMMERCIAL

## 2022-01-27 NOTE — TELEPHONE ENCOUNTER
Short Term Disability for was faxed into clinic today. I put in out guide and put on Danielle VALENTINE's desk.

## 2022-01-31 NOTE — PROGRESS NOTES
"Assessment:   1.  Eight day old infant gaining weight well on expressed breastmilk:  3 oz over birthweight today  2.  Good latch, suck and milk transfer in office today  3.  Mother with overabundant milk supply    Plan:   1.  Use good positioning for deep latch, with baby held close to body and baby's head/shoulders/hips in good alignment.  When in a seated position, use a pillow to help bring baby close to breasts, and stepstool to elevate your knees above hips.   2.  Present breast in the \"sandwich\" hold, compressing breast vertically and in line with baby's mouth, for baby to get a larger mouthful of breast and a deeper latch.  If there is feel pinching or pain, stop, use a finger to break the suction, remove baby from the breast and try again until there is no pain with nursing.  There is sometimes a little pain when the baby first begins sucking, but after the first few seconds there should be no pain--only a tugging feeling.  Do not continue with the same position if nursing is painful;  Always restart!    3.  Babies latch best to the breast by bringing their chin in first, so point your nipple towards baby's nose, tuck the chin in close, and then wait for his mouth to open.  When his mouth opens, bring his head in deeply.  Baby's chin should be snugged deeply in your breast, their upper cheeks should be touching the breast, and their nose just out of the breast.  4.  If Heriberto is not latched deeply to the breast and you feel pinching, you can take him off and try again, or give a little pressure on his chin to help him open a bit wider and take in more of your breast.   5. If he is still struggling to latch to the breast, you can give a little milk in a bottle, around a half ounce, to calm him and allow him to try again at the breast more easily.  6.  Nurse baby on cue, 8-12 times each day.  Feed on one side until baby finishes swallowing.  Once swallowing slows, use breast compression to encourage more " "swallowing, but once there is no more active swallowing, and baby is either sleeping, coming off the breast, or just \"nibbling,\" it is OK to use a finger to take baby off the breast and move to the other breast.  Do the same on the other side.  Offer both breasts at each feeding.  It is more important to watch the baby than the clock!   7.  Heriberto needs about 17-18 oz of milk each day to grow well.  If he nurses at home as he did in the office today, about 9 times/day, he doesn't need any extra milk in bottles.  8.  You can now wean back a bit on pumping, so that your milk supply is in line with Heriberto's needs and your breasts are comfortable: you can do this by slowly decreasing the amount of milk that you pump. For example, instead of pumping until 8 oz come, stop after 7.  Then drop back by another ounce, and then another, until you can comfortably drop that pumping entirely.  You can drop back on each session this way, until you are just pumping once or twice/day, or as much as you desire.  8. Covid-19 vaccination does provide the baby with antibodies to Covid-19 through breastmilk, and will continue to do so through duration of breastfeeding.  9.  See pediatric provider as planned, and lactation as needed.  [employer] can be used for brief questions, but it's important to know that messages are not seen Friday through Sunday. If urgent help is needed, Monday through Friday you can call [phone] and one of our lactation consultants will get the message and respond; if you need a rapid response over a weekend or holiday, it is best to call your on-call maternity or pediatric provider.  Please feel free to schedule a return visit if the concern is more detailed;  telephone visits are also an option if you don't feel you need to be seen in person.      Subjective: Melissa and Zack are here today because of difficulty latching baby Heriberto to the breast;  Melissa reports he will latch briefly and then pull away, which is " painful.  Recently has been offering Heriberto the breast about once daily, but primarily pumping, because of this difficulty.  Would prefer to be directly breastfeeding.  Melissa has been able to establish an excellent milk supply.     Melissa is vaccinated for Covid-19.    Hospital Course: Spontaneous labor and uncomplicated birth other than thick meconium-stained fluid.  Seen by hospital IBCLC for difficulty with latch and suck;  Noted very minimal suck effort by infant.  Was exclusively pumping on discharge due to this concern;  Rented hospital grade pump.    Mother's Relevant Med/Surg History: noncontributory    Breast Surgery: none    Breastfeeding Goals: exclusive direct breastfeeding    Previous Breastfeeding Experience: first baby    Infant's name: Heriberto  Infant's bday: [birth year]  Gestational age: 39w3d  Infant's birth weight: 6 # 7 oz    Mode of delivery: vaginal  Pediatric Provider: Dr. Isabel Beckham, [facility]  Discharge weight: 6 # 4.7 oz  Recent weight at ped visit 1/31/22: 6 # 11 oz      Frequency and duration of feedings: every 2-3  hours  Swallows audible per mother: yes  Numbers of feedings in 24 hours: 9  Number urines per day: 9  Number of stools per day and their color: 8    Supplementation: with 1 1/2 - 2 oz  Pumping: every 4 hours, yielding 7-8 oz    Objective/Physical exam:   Mother: Noticed breasts grew larger and areolas darkened during pregnancy and she noticed primary engorgement when her milk came in on day 3  Her nipples are everted, the areola is compressible, the breast is soft and full.     Sore nipples: slightly tender  EPDS: 0    Assessment of infant: 10.24% Weight for age percentile   Age today: 8 days  Today's weight: 6 # 10.6 oz  Amount of milk transferred from LEFT side: 2 oz  Amount of milk transferred from RIGHT side: 0.6 oz    Baby has full flexion of arms and legs, normal tone, behavior is alert and active, respirations are normal, skin is normal, hydration is normal,  jaw is normal size and alignment, palate is normal, frenulum is normal, baby can lateralize tongue, has adequate tongue lift, and tongue can protrude past bottom gum line. Upper labial frenulum is normal.    Suck exam:  Baby has strong, coordinated suck with good tongue cupping    Baby thrush: none   Jaundice: none     Feeding assessment: After assistance with positioning and breast support, baby was able to hold suction with tongue while at the breast.     Alignment: The baby was flex relaxed. Baby's head was aligned with its trunk. Baby did face mother. Baby was in cross cradle position today.     Areolar Grasp: Baby was able to open mouth wide. Baby's lips were not pursed. Baby's lips did flange outward. Tongue was visible over bottom gum. Baby had complete seal.     Areolar Compression: Baby made rhythmic motion. There were no clicking or smacking sounds. There was no severe nipple discomfort. Nipples appeared rounded after feeding.    Audible swallowing: Baby made quiet sounds of swallowing: There was an increase in frequency after milk ejection reflex. The milk ejection reflex is normal and milk supply is abundant.     /84 (BP Location: Right arm, Patient Position: Sitting, Cuff Size: Adult Regular)   LMP  (LMP Unknown)   OB History    Para Term  AB Living   1 1 1 0 0 1   SAB IAB Ectopic Multiple Live Births   0 0 0 0 1      # Outcome Date GA Lbr Khari/2nd Weight Sex Delivery Anes PTL Lv   1 Term 22 39w3d / 01:10 2.92 kg (6 lb 7 oz) M Vag-Spont EPI N NETTA      Name: BRIANA NEAL-ANSLEY      Apgar1: 9  Apgar5: 9       Current Outpatient Medications:      acetaminophen (TYLENOL) 325 MG tablet, Take 2 tablets (650 mg) by mouth every 4 hours as needed for mild pain or fever, Disp: 30 tablet, Rfl: 0     benzocaine-menthol (DERMOPLAST) 20-0.5 % AERO, Apply 1 g topically every 4 hours as needed (perineal pain), Disp: 78 g, Rfl: 0     ibuprofen (ADVIL/MOTRIN) 800 MG tablet, Take 1 tablet (800  mg) by mouth every 6 hours as needed for other (cramping), Disp: 30 tablet, Rfl: 0     lanolin ointment, Apply topically every hour as needed for other (sore nipples), Disp: 7 g, Rfl: 1     prenatal vit no.130-iron-folic (PRENATAL VITAMIN) 27 mg iron- 800 mcg Tab tablet, [PRENATAL VIT NO.130-IRON-FOLIC (PRENATAL VITAMIN) 27 MG IRON- 800 MCG TAB TABLET] Take 1 tablet by mouth daily., Disp: 90 tablet, Rfl: 3  No past medical history on file.  No past surgical history on file.  Family History   Problem Relation Age of Onset     No Known Problems Mother      Skin Cancer Father        Time spent:  Chart review/prechartin min prior to day of service  Face-to-face visit:   42 min   Documentation:  15 min  Total time spent on day of service: 57 min    KACIE Cruz, CNM, IBCLC

## 2022-02-01 ENCOUNTER — ALLIED HEALTH/NURSE VISIT (OUTPATIENT)
Dept: MIDWIFE SERVICES | Facility: CLINIC | Age: 27
End: 2022-02-01
Payer: COMMERCIAL

## 2022-02-01 VITALS — SYSTOLIC BLOOD PRESSURE: 116 MMHG | DIASTOLIC BLOOD PRESSURE: 84 MMHG

## 2022-02-01 DIAGNOSIS — O92.79 OTHER DISORDERS OF LACTATION: Primary | ICD-10-CM

## 2022-02-01 PROCEDURE — 99204 OFFICE O/P NEW MOD 45 MIN: CPT | Performed by: ADVANCED PRACTICE MIDWIFE

## 2022-02-01 NOTE — PATIENT INSTRUCTIONS
"  1.  Use good positioning for deep latch, with baby held close to body and baby's head/shoulders/hips in good alignment.  When in a seated position, use a pillow to help bring baby close to breasts, and stepstool to elevate your knees above hips.   2.  Present breast in the \"sandwich\" hold, compressing breast vertically and in line with baby's mouth, for baby to get a larger mouthful of breast and a deeper latch.  If there is feel pinching or pain, stop, use a finger to break the suction, remove baby from the breast and try again until there is no pain with nursing.  There is sometimes a little pain when the baby first begins sucking, but after the first few seconds there should be no pain--only a tugging feeling.  Do not continue with the same position if nursing is painful;  Always restart!    3.  Babies latch best to the breast by bringing their chin in first, so point your nipple towards baby's nose, tuck the chin in close, and then wait for his mouth to open.  When his mouth opens, bring his head in deeply.  Baby's chin should be snugged deeply in your breast, their upper cheeks should be touching the breast, and their nose just out of the breast.  4.  If Heriberto is not latched deeply to the breast and you feel pinching, you can take him off and try again, or give a little pressure on his chin to help him open a bit wider and take in more of your breast.   5. If he is still struggling to latch to the breast, you can give a little milk in a bottle, like about a half ounce, to calm him and allow him to try again at the breast more easily.  6.  Nurse baby on cue, 8-12 times each day.  Feed on one side until baby finishes swallowing.  Once swallowing slows, use breast compression to encourage more swallowing, but once there is no more active swallowing, and baby is either sleeping, coming off the breast, or just \"nibbling,\" it is OK to use a finger to take baby off the breast and move to the other breast.  Do the " same on the other side.  Offer both breasts at each feeding.  It is more important to watch the baby than the clock!   7.  Heriberto needs about 17-18 oz of milk each day to grow well.  If he nurses at home as he did in the office today, about 9 times/day, he doesn't need any extra milk in bottles.8.  You can now wean back a bit on pumping:  instead of pumping until 8 oz come, stop after 7.  Then drop back by another ounce, and then another, until you can comfortably drop that pumping entirely.  You can drop back on each session this way, until you are just pumping once or twice.   8. Covid-19 vaccination does provide the baby with antibodies to Covid-19 through breastmilk, and will continue to do so through duration of breastfeeding.  9.  See pediatric provider as planned, and lactation as needed.  [employer] can be used for brief questions, but it's important to know that messages are not seen Friday through Sunday. If urgent help is needed, Monday through Friday you can call [phone] and one of our lactation consultants will get the message and respond; if you need a rapid response over a weekend or holiday, it is best to call your on-call maternity or pediatric provider.  Please feel free to schedule a return visit if the concern is more detailed;  telephone visits are also an option if you don't feel you need to be seen in person.    _____________    Good breastfeeding resources:    Websites:  www.[employer] Lock  www.iTwin.Zite/blog/  www.llli.org/breastfeeding-info/    Book:   The Womanly Art of Breastfeeding by [employer]      For help with using baby carriers:  https://babywearingtwincities.org/

## 2022-02-03 ENCOUNTER — MEDICAL CORRESPONDENCE (OUTPATIENT)
Dept: HEALTH INFORMATION MANAGEMENT | Facility: CLINIC | Age: 27
End: 2022-02-03
Payer: COMMERCIAL

## 2022-02-23 ENCOUNTER — MEDICAL CORRESPONDENCE (OUTPATIENT)
Dept: HEALTH INFORMATION MANAGEMENT | Facility: CLINIC | Age: 27
End: 2022-02-23
Payer: COMMERCIAL

## 2022-02-23 ENCOUNTER — DOCUMENTATION ONLY (OUTPATIENT)
Dept: FAMILY MEDICINE | Facility: CLINIC | Age: 27
End: 2022-02-23
Payer: COMMERCIAL

## 2022-02-23 ASSESSMENT — EDINBURGH POSTNATAL DEPRESSION SCALE (EPDS)
THE THOUGHT OF HARMING MYSELF HAS OCCURRED TO ME: NEVER
THINGS HAVE BEEN GETTING ON TOP OF ME: NO, I HAVE BEEN COPING AS WELL AS EVER
I HAVE BEEN SO UNHAPPY THAT I HAVE HAD DIFFICULTY SLEEPING: NOT AT ALL
I HAVE BLAMED MYSELF UNNECESSARILY WHEN THINGS WENT WRONG: NO, NEVER
TOTAL SCORE: 0
I HAVE BEEN SO UNHAPPY THAT I HAVE BEEN CRYING: NO, NEVER
I HAVE BEEN ANXIOUS OR WORRIED FOR NO GOOD REASON: NO, NOT AT ALL
I HAVE LOOKED FORWARD WITH ENJOYMENT TO THINGS: AS MUCH AS I EVER DID
I HAVE BEEN ABLE TO LAUGH AND SEE THE FUNNY SIDE OF THINGS: AS MUCH AS I ALWAYS COULD
I HAVE FELT SCARED OR PANICKY FOR NO GOOD REASON: NO, NOT AT ALL
I HAVE FELT SAD OR MISERABLE: NO, NOT AT ALL

## 2022-02-24 ENCOUNTER — ALLIED HEALTH/NURSE VISIT (OUTPATIENT)
Dept: MIDWIFE SERVICES | Facility: CLINIC | Age: 27
End: 2022-02-24
Payer: COMMERCIAL

## 2022-02-24 VITALS
BODY MASS INDEX: 23.99 KG/M2 | SYSTOLIC BLOOD PRESSURE: 112 MMHG | HEART RATE: 84 BPM | WEIGHT: 162 LBS | DIASTOLIC BLOOD PRESSURE: 72 MMHG | HEIGHT: 69 IN

## 2022-02-24 DIAGNOSIS — O92.29 POSTPARTUM NIPPLE PAIN: Primary | ICD-10-CM

## 2022-02-24 PROCEDURE — 99215 OFFICE O/P EST HI 40 MIN: CPT | Performed by: ADVANCED PRACTICE MIDWIFE

## 2022-02-24 ASSESSMENT — EDINBURGH POSTNATAL DEPRESSION SCALE (EPDS)
I HAVE BEEN SO UNHAPPY THAT I HAVE BEEN CRYING: NO, NEVER
TOTAL SCORE: 0
I HAVE FELT SCARED OR PANICKY FOR NO GOOD REASON: NO, NOT AT ALL
I HAVE BLAMED MYSELF UNNECESSARILY WHEN THINGS WENT WRONG: NO, NEVER
I HAVE FELT SAD OR MISERABLE: NO, NOT AT ALL
I HAVE LOOKED FORWARD WITH ENJOYMENT TO THINGS: AS MUCH AS I EVER DID
THE THOUGHT OF HARMING MYSELF HAS OCCURRED TO ME: NEVER
THINGS HAVE BEEN GETTING ON TOP OF ME: NO, I HAVE BEEN COPING AS WELL AS EVER
I HAVE BEEN ANXIOUS OR WORRIED FOR NO GOOD REASON: NO, NOT AT ALL
I HAVE BEEN ABLE TO LAUGH AND SEE THE FUNNY SIDE OF THINGS: AS MUCH AS I ALWAYS COULD
I HAVE BEEN SO UNHAPPY THAT I HAVE HAD DIFFICULTY SLEEPING: NOT AT ALL

## 2022-02-24 NOTE — PROGRESS NOTES
"Assessment:   1.  One month old infant gaining weight very well on exclusive breastfeeding   2.  Good latch, suck and milk transfer in office today  3.  Mother with nipple vasospasm  4.  Mother with ample milk supply, now more in line with baby Heriberto's needs    Plan:   1.  Continue to nurse Heriberto on cue, 8-12 times each day.  Feed on one side until baby finishes swallowing.  Once swallowing slows, use breast compression to encourage more swallowing, but once there is no more active swallowing, and baby is either sleeping, coming off the breast, or just \"nibbling,\" it is OK to use a finger to take baby off the breast and move to the other breast.  If he does not take the second side, that is fine--he is likely satisfied with just one breast.  Just continue to alternate the breast that you begin with.  2.  After feeding, apply some heat to your nipples;  You can use a heating pad, a sock full of rice that you have microwaved briefly, or a special breastfeeding device for this, if you like.    3.  Adding a supplement of Vitamin B6, 400 mg for four days followed by 50 mg daily.  4.  Also try the larger pump flange, either the 28 mm and 30 mm.  If you feel there is friction, then try some coconut oil or olive oil inside the flange to help with comfort.  5.  See provider as planned, and lactation as needed.  [employer] can be used for brief questions, but it's important to know that messages are not seen Friday through Sunday. If urgent help is needed, Monday through Friday you can call [phone] and one of our lactation consultants will get the message and respond; if you need a rapid response over a weekend or holiday, it is best to call your on-call maternity or pediatric provider.  Please feel free to schedule a return visit if the concern is more detailed;  telephone visits are also an option if you don't feel you need to be seen in person.      Subjective: Pamela are here today for a follow-up visit.  They " "were seen about 3 weeks ago because of difficulty latching baby Heriberto to the breast;  Melissa had been almost-exclusively pumping, and wanted to return baby Heriberto to the breast.  At that visit baby did latch to breast and had good milk transfer;  Discussed how to wean back on pumping, as supply was abundant.  Today Melissa reports that gavin Louis is now fully at breast, but Melissa is experiencing stinging nipple pain, which generally begins at the end of the feeding and persists for 20-30 min.  Has been taking Tylenol and using ice, which is \"helpful in the moment.\"   Notices some nipple blanching with this.  Does not notice big change with exposure to cold air or getting out of the shower.      Melissa is vaccinated for Covid-19.    Hospital Course: Spontaneous labor and uncomplicated birth other than thick meconium-stained fluid.  Seen by hospital IBCLC for difficulty with latch and suck;  Noted very minimal suck effort by infant.  Was exclusively pumping on discharge due to this concern;  Rented hospital grade pump.    Mother's Relevant Med/Surg History: noncontributory    Breast Surgery: none    Breastfeeding Goals: exclusive direct breastfeeding    Previous Breastfeeding Experience: first baby    Infant's name: Heriberto  Infant's bday: [birth year]  Gestational age: 39w3d  Infant's birth weight: 6 # 7 oz    Mode of delivery: vaginal  Pediatric Provider: Dr. Isabel Beckham, [facility]  Discharge weight: 6 # 4.7 oz  Recent weights:   1/31/22: 6 # 11 oz  2/1/22:   6 # 10.6 oz    Frequency and duration of feedings: every 2-4  Hours for about 10 min, usually taking just one breast   Swallows audible per mother: yes  Numbers of feedings in 24 hours: 7-8  Number urines per day: 11  Number of stools per day and their color: 5, yellow seedy    Supplementation: one bottle daily with just about 1 oz, to give Vitamin D  Pumping: once daily, yielding around 4 oz    Objective/Physical exam:   Mother: Noticed breasts grew " "larger and areolas darkened during pregnancy and she noticed primary engorgement when her milk came in on day 3  Her nipples are everted, the areola is compressible, the breast is soft and full.     Sore nipples: slightly tender, painful after nursing  EPDS: 0    Assessment of infant: 21.55% Weight for age percentile   Age today: 1 month  Today's weight: 8 # 14.4 oz   Amount of milk transferred from LEFT side: 3.2 oz  Amount of milk transferred from RIGHT side: not offered-- baby appears satisfied    Baby has full flexion of arms and legs, normal tone, behavior is alert and active, respirations are normal, skin is normal, hydration is normal, jaw is normal size and alignment, palate is normal, frenulum is normal, baby can lateralize tongue, has adequate tongue lift, and tongue can protrude past bottom gum line. Upper labial frenulum is normal.    Baby thrush: none   Jaundice: none     Feeding assessment: Baby was able to hold suction with tongue while at the breast.     Alignment: The baby was flex relaxed. Baby's head was aligned with its trunk. Baby did face mother. Baby was in cross cradle position today.     Areolar Grasp: Baby was able to open mouth wide. Baby's lips were not pursed. Baby's lips did flange outward. Tongue was visible over bottom gum. Baby had complete seal.     Areolar Compression: Baby made rhythmic motion. There were no clicking or smacking sounds. There was no severe nipple discomfort. Nipples appeared rounded after feeding.    Audible swallowing: Baby made quiet sounds of swallowing: There was an increase in frequency after milk ejection reflex. The milk ejection reflex is normal and milk supply is abundant.     Pumping:  Pumped briefly in office today using 24 mm, 28 mm and 30 mm flanges.  28 or 30 mm were best fit.    Ht 1.753 m (5' 9\")   LMP  (LMP Unknown)   BMI 27.47 kg/m    OB History    Para Term  AB Living   1 1 1 0 0 1   SAB IAB Ectopic Multiple Live Births   0 0 0 0 " 1      # Outcome Date GA Lbr Khari/2nd Weight Sex Delivery Anes PTL Lv   1 Term 22 39w3d / 01:10 2.92 kg (6 lb 7 oz) M Vag-Spont EPI N NETTA      Name: BRIANA NEAL-ANSLEY      Apgar1: 9  Apgar5: 9       Current Outpatient Medications:      acetaminophen (TYLENOL) 325 MG tablet, Take 2 tablets (650 mg) by mouth every 4 hours as needed for mild pain or fever, Disp: 30 tablet, Rfl: 0     benzocaine-menthol (DERMOPLAST) 20-0.5 % AERO, Apply 1 g topically every 4 hours as needed (perineal pain), Disp: 78 g, Rfl: 0     ibuprofen (ADVIL/MOTRIN) 800 MG tablet, Take 1 tablet (800 mg) by mouth every 6 hours as needed for other (cramping), Disp: 30 tablet, Rfl: 0     lanolin ointment, Apply topically every hour as needed for other (sore nipples), Disp: 7 g, Rfl: 1     prenatal vit no.130-iron-folic (PRENATAL VITAMIN) 27 mg iron- 800 mcg Tab tablet, [PRENATAL VIT NO.130-IRON-FOLIC (PRENATAL VITAMIN) 27 MG IRON- 800 MCG TAB TABLET] Take 1 tablet by mouth daily., Disp: 90 tablet, Rfl: 3  No past medical history on file.  No past surgical history on file.  Family History   Problem Relation Age of Onset     No Known Problems Mother      Skin Cancer Father        Time spent:  Chart review/prechartin min on day of service  Face-to-face visit:   33 min   Documentation: 5 min  Total time spent on day of service: 43 min    KACIE Cruz, CNM, IBCLC

## 2022-02-24 NOTE — PATIENT INSTRUCTIONS
"  1.  Continue to nurse Heriberto on cue, 8-12 times each day.  Feed on one side until baby finishes swallowing.  Once swallowing slows, use breast compression to encourage more swallowing, but once there is no more active swallowing, and baby is either sleeping, coming off the breast, or just \"nibbling,\" it is OK to use a finger to take baby off the breast and move to the other breast.  If he does not take the second side, that is fine--he is likely satisfied with just one breast.  Just continue to alternate the breast that you begin with.  2.  After feeding, apply some heat to your nipples;  You can use a heating pad, a sock full of rice that you have microwaved briefly, or a special breastfeeding device for this, if you like.    3.  Adding a supplement of Vitamin B6, 400 mg for four days followed by 50 mg daily.  4.  Also try the larger pump flange, either the 28 mm and 30 mm.  If you feel there is friction, then try some coconut oil or olive oil inside the flange to help with comfort.  5.  See provider as planned, and lactation as needed.  [employer] can be used for brief questions, but it's important to know that messages are not seen Friday through Sunday. If urgent help is needed, Monday through Friday you can call [phone] and one of our lactation consultants will get the message and respond; if you need a rapid response over a weekend or holiday, it is best to call your on-call maternity or pediatric provider.  Please feel free to schedule a return visit if the concern is more detailed;  telephone visits are also an option if you don't feel you need to be seen in person.    _____________      For managing vasospasm:   - adjust position to reduce fast flow (laid back, football hold with baby upright and mom reclining).   - start w/ less painful side first   - Heating pad on low (or hand warmer, rice sock) placed over clothing on breasts right after pumping or nursing   - Keep nipples moist and warm. No air " drying. Protect from friction. Use light touch.   - Consider large pumping flanges   - Pumping one side at a time and cover other side w/ heating pad   - Mindfulness/meditation, other calming strategies   - Ibuprofen/acetaminophen for pain  - Try Vit B 6 supplement:  200 mg/day for four days, then 25-50 mg daily (this dose can be taken as part of a B- complex vitamin)  - Limit caffeine   - If pain is intolerable, a medication can be considered .    _____________________

## 2022-03-03 ENCOUNTER — MYC MEDICAL ADVICE (OUTPATIENT)
Dept: FAMILY MEDICINE | Facility: CLINIC | Age: 27
End: 2022-03-03

## 2022-03-03 ENCOUNTER — OFFICE VISIT (OUTPATIENT)
Dept: FAMILY MEDICINE | Facility: CLINIC | Age: 27
End: 2022-03-03
Payer: COMMERCIAL

## 2022-03-03 VITALS
WEIGHT: 159 LBS | DIASTOLIC BLOOD PRESSURE: 55 MMHG | HEART RATE: 77 BPM | SYSTOLIC BLOOD PRESSURE: 108 MMHG | BODY MASS INDEX: 23.55 KG/M2 | HEIGHT: 69 IN | TEMPERATURE: 97.8 F

## 2022-03-03 DIAGNOSIS — Z12.4 CERVICAL CANCER SCREENING: ICD-10-CM

## 2022-03-03 PROCEDURE — 99207 PR POST PARTUM EXAM: CPT | Performed by: FAMILY MEDICINE

## 2022-03-03 PROCEDURE — G0123 SCREEN CERV/VAG THIN LAYER: HCPCS | Performed by: FAMILY MEDICINE

## 2022-03-03 NOTE — PROGRESS NOTES
"Ansley Neal is a 26 year old  female presenting for routine postpartum follow up.    Date of delivery was 22 via .  Complications noted during the pregnancy include none.  Complications at the time of delivery include none.      Incision is healing well, pain has subsided  Mood: denies concerns with postpartum blues/depression  Energy: good, no fatigue  Contraception: undecided  Has not resumed sexual activity   No return of period yet and no worrisome bleeding  Breast/bottle feeding  Still taking prenatal vitamin     Other concerns today include none.    No Known Allergies    Current Outpatient Medications   Medication Sig Dispense Refill     lanolin ointment Apply topically every hour as needed for other (sore nipples) 7 g 1     prenatal vit no.130-iron-folic (PRENATAL VITAMIN) 27 mg iron- 800 mcg Tab tablet [PRENATAL VIT NO.130-IRON-FOLIC (PRENATAL VITAMIN) 27 MG IRON- 800 MCG TAB TABLET] Take 1 tablet by mouth daily. 90 tablet 3       No past medical history on file.    OB History    Para Term  AB Living   1 1 1 0 0 1   SAB IAB Ectopic Multiple Live Births   0 0 0 0 1      # Outcome Date GA Lbr Khari/2nd Weight Sex Delivery Anes PTL Lv   1 Term 22 39w3d / 01:10 2.92 kg (6 lb 7 oz) M Vag-Spont EPI N NETTA      Name: BRIANA NEAL-ANSLEY      Apgar1: 9  Apgar5: 9       /55   Pulse 77   Temp 97.8  F (36.6  C) (Oral)   Ht 1.753 m (5' 9\")   Wt 72.1 kg (159 lb)   LMP  (LMP Unknown)   BMI 23.48 kg/m     General: no apparent distress, appears well  Neck: no thyromegaly or cervical adenopathy  Cardiovascular: regular rate and rhythm without murmur  Pulmonary: clear to auscultation bilaterally without wheezing or crackles  Abdomen: Soft, non-tender.    GYN: uterus is normal non-gravid size and nontender, well healed vaginal wall and external genitalia, cervix normal appearing  Lower extremity: no significant swelling    Assessment/Plan  1. Routine Post Partum Care: Overall " doing well and adjusting to new baby. Post partum depression has not been a concern.  Continues to breast-feed.  Undecided about contraception, options reviewed and she will let us know if she is interested.  Recommended continuing prenatal vitamin during breastfeeding. Ok to resume normal activities without restriction.     Isabel Beckham

## 2022-03-07 LAB
BKR LAB AP GYN ADEQUACY: NORMAL
BKR LAB AP GYN INTERPRETATION: NORMAL
BKR LAB AP HPV REFLEX: NORMAL
BKR LAB AP PREVIOUS ABNORMAL: NORMAL
PATH REPORT.COMMENTS IMP SPEC: NORMAL
PATH REPORT.COMMENTS IMP SPEC: NORMAL
PATH REPORT.RELEVANT HX SPEC: NORMAL

## 2022-03-24 ENCOUNTER — DOCUMENTATION ONLY (OUTPATIENT)
Dept: FAMILY MEDICINE | Facility: CLINIC | Age: 27
End: 2022-03-24
Payer: COMMERCIAL

## 2022-03-24 ASSESSMENT — EDINBURGH POSTNATAL DEPRESSION SCALE (EPDS)
I HAVE BEEN SO UNHAPPY THAT I HAVE HAD DIFFICULTY SLEEPING: NOT AT ALL
I HAVE BEEN ANXIOUS OR WORRIED FOR NO GOOD REASON: NO, NOT AT ALL
I HAVE FELT SCARED OR PANICKY FOR NO GOOD REASON: NO, NOT AT ALL
I HAVE BEEN ABLE TO LAUGH AND SEE THE FUNNY SIDE OF THINGS: AS MUCH AS I ALWAYS COULD
TOTAL SCORE: 0
I HAVE FELT SAD OR MISERABLE: NO, NOT AT ALL
THE THOUGHT OF HARMING MYSELF HAS OCCURRED TO ME: NEVER
I HAVE BLAMED MYSELF UNNECESSARILY WHEN THINGS WENT WRONG: NO, NEVER
I HAVE LOOKED FORWARD WITH ENJOYMENT TO THINGS: AS MUCH AS I EVER DID
THINGS HAVE BEEN GETTING ON TOP OF ME: NO, I HAVE BEEN COPING AS WELL AS EVER
I HAVE BEEN SO UNHAPPY THAT I HAVE BEEN CRYING: NO, NEVER

## 2022-05-24 ENCOUNTER — DOCUMENTATION ONLY (OUTPATIENT)
Dept: FAMILY MEDICINE | Facility: CLINIC | Age: 27
End: 2022-05-24
Payer: COMMERCIAL

## 2022-05-24 ASSESSMENT — EDINBURGH POSTNATAL DEPRESSION SCALE (EPDS)
I HAVE FELT SCARED OR PANICKY FOR NO GOOD REASON: NO, NOT AT ALL
I HAVE BEEN ANXIOUS OR WORRIED FOR NO GOOD REASON: NO, NOT AT ALL
I HAVE BLAMED MYSELF UNNECESSARILY WHEN THINGS WENT WRONG: NO, NEVER
I HAVE BEEN SO UNHAPPY THAT I HAVE HAD DIFFICULTY SLEEPING: NOT AT ALL
THINGS HAVE BEEN GETTING ON TOP OF ME: NO, I HAVE BEEN COPING AS WELL AS EVER
I HAVE BEEN ABLE TO LAUGH AND SEE THE FUNNY SIDE OF THINGS: AS MUCH AS I ALWAYS COULD
I HAVE LOOKED FORWARD WITH ENJOYMENT TO THINGS: AS MUCH AS I EVER DID
I HAVE BEEN SO UNHAPPY THAT I HAVE BEEN CRYING: NO, NEVER
I HAVE FELT SAD OR MISERABLE: NO, NOT AT ALL
TOTAL SCORE: 0
THE THOUGHT OF HARMING MYSELF HAS OCCURRED TO ME: NEVER

## 2022-09-10 ENCOUNTER — HEALTH MAINTENANCE LETTER (OUTPATIENT)
Age: 27
End: 2022-09-10

## 2023-04-30 ENCOUNTER — HEALTH MAINTENANCE LETTER (OUTPATIENT)
Age: 28
End: 2023-04-30

## 2023-10-17 ENCOUNTER — TRANSFERRED RECORDS (OUTPATIENT)
Dept: HEALTH INFORMATION MANAGEMENT | Facility: CLINIC | Age: 28
End: 2023-10-17

## 2023-10-17 ENCOUNTER — LAB REQUISITION (OUTPATIENT)
Dept: LAB | Facility: CLINIC | Age: 28
End: 2023-10-17
Payer: COMMERCIAL

## 2023-10-17 DIAGNOSIS — Z34.81 ENCOUNTER FOR SUPERVISION OF OTHER NORMAL PREGNANCY, FIRST TRIMESTER: ICD-10-CM

## 2023-10-17 LAB
BASO+EOS+MONOS # BLD AUTO: NORMAL 10*3/UL
BASO+EOS+MONOS NFR BLD AUTO: NORMAL %
BASOPHILS # BLD AUTO: 0.1 10E3/UL (ref 0–0.2)
BASOPHILS NFR BLD AUTO: 1 %
EOSINOPHIL # BLD AUTO: 0.1 10E3/UL (ref 0–0.7)
EOSINOPHIL NFR BLD AUTO: 1 %
ERYTHROCYTE [DISTWIDTH] IN BLOOD BY AUTOMATED COUNT: 12.1 % (ref 10–15)
HCT VFR BLD AUTO: 41.6 % (ref 35–47)
HGB BLD-MCNC: 14.4 G/DL (ref 11.7–15.7)
IMM GRANULOCYTES # BLD: 0.1 10E3/UL
IMM GRANULOCYTES NFR BLD: 1 %
LYMPHOCYTES # BLD AUTO: 2.2 10E3/UL (ref 0.8–5.3)
LYMPHOCYTES NFR BLD AUTO: 22 %
MCH RBC QN AUTO: 30.7 PG (ref 26.5–33)
MCHC RBC AUTO-ENTMCNC: 34.6 G/DL (ref 31.5–36.5)
MCV RBC AUTO: 89 FL (ref 78–100)
MONOCYTES # BLD AUTO: 0.6 10E3/UL (ref 0–1.3)
MONOCYTES NFR BLD AUTO: 6 %
NEUTROPHILS # BLD AUTO: 7 10E3/UL (ref 1.6–8.3)
NEUTROPHILS NFR BLD AUTO: 69 %
NRBC # BLD AUTO: 0 10E3/UL
NRBC BLD AUTO-RTO: 0 /100
PLATELET # BLD AUTO: 240 10E3/UL (ref 150–450)
RBC # BLD AUTO: 4.69 10E6/UL (ref 3.8–5.2)
WBC # BLD AUTO: 10 10E3/UL (ref 4–11)

## 2023-10-17 PROCEDURE — 86803 HEPATITIS C AB TEST: CPT | Mod: ORL | Performed by: NURSE PRACTITIONER

## 2023-10-17 PROCEDURE — 80081 OBSTETRIC PANEL INC HIV TSTG: CPT | Mod: ORL | Performed by: NURSE PRACTITIONER

## 2023-10-17 PROCEDURE — 87086 URINE CULTURE/COLONY COUNT: CPT | Mod: ORL | Performed by: NURSE PRACTITIONER

## 2023-10-18 LAB
ABO/RH(D): NORMAL
ANTIBODY SCREEN: NEGATIVE
HBV SURFACE AG SERPL QL IA: NONREACTIVE
HCV AB SERPL QL IA: NONREACTIVE
HIV 1+2 AB+HIV1 P24 AG SERPL QL IA: NONREACTIVE
RPR SER QL: NONREACTIVE
RUBV IGG SERPL QL IA: 9.67 INDEX
RUBV IGG SERPL QL IA: POSITIVE
SPECIMEN EXPIRATION DATE: NORMAL

## 2023-10-19 LAB — BACTERIA UR CULT: NO GROWTH

## 2024-01-10 ENCOUNTER — TRANSFERRED RECORDS (OUTPATIENT)
Dept: HEALTH INFORMATION MANAGEMENT | Facility: CLINIC | Age: 29
End: 2024-01-10

## 2024-02-07 ENCOUNTER — TRANSFERRED RECORDS (OUTPATIENT)
Dept: HEALTH INFORMATION MANAGEMENT | Facility: CLINIC | Age: 29
End: 2024-02-07
Payer: COMMERCIAL

## 2024-02-19 ENCOUNTER — TELEPHONE (OUTPATIENT)
Dept: FAMILY MEDICINE | Facility: CLINIC | Age: 29
End: 2024-02-19
Payer: COMMERCIAL

## 2024-02-19 DIAGNOSIS — Z34.82 ENCOUNTER FOR SUPERVISION OF OTHER NORMAL PREGNANCY IN SECOND TRIMESTER: Primary | ICD-10-CM

## 2024-02-19 NOTE — TELEPHONE ENCOUNTER
Please help patient schedule 28-week initial OB visit first week of March.  Use one of my held prenatal spots.  Please verify she has had her gestational glucose screen, and confirm she plans to continue OB care with myself throughout pregnancy?    Isabel Beckham, DO

## 2024-02-19 NOTE — TELEPHONE ENCOUNTER
Health Call Center    Phone Message    May a detailed message be left on voicemail: yes     Reason for Call: Other: Pt is calling because she is currently being seen at [facility] for prenatal care. Patient is currently 26 weeks and is wanting to see  at her 28 week check in which would be the first week in March. Patient was scheduled for the first available but it is not near the 28 week yue.Patient also would like to know how [facility] works, does she need to request the records or will [facility] request from [facility]. Please call Pt to discuss      Action Taken: Other: [facility]/OB    Travel Screening: Not Applicable

## 2024-02-20 NOTE — TELEPHONE ENCOUNTER
Called and left message to return call. When pt returns call please relay message below.     I held a couple spots for her on 3/12, prefer a 40 minute spot but if it doesn't work ok to use any of the prenatal spots in early March.

## 2024-02-20 NOTE — TELEPHONE ENCOUNTER
Please help patient schedule lab visit for 1 hour glucose tolerance test.  Should be 24 to 28 weeks gestation.    Isabel Beckham DO

## 2024-02-20 NOTE — TELEPHONE ENCOUNTER
[facility] Call Center    Phone Message    May a detailed message be left on voicemail: yes     Reason for Call: Other: Pt called to clarify if order can be placed for GTT 1hr and do testing prior to current initial visit with Dr Beckham. Pt will be 30wks at 3/20/24. Writer unaware of  protocol for GTT & informed pt message would be sent to care team. Please contact pt to schedule, or inform next steps.      Action Taken: Message routed to:  Other: TS    Travel Screening: Not Applicable

## 2024-02-21 NOTE — TELEPHONE ENCOUNTER
First Attempt: I sent a my chart message for the patient to schedule a lab only appt for her 1 hour glucose test and she will need to be 24-28 weeks along.

## 2024-02-23 ENCOUNTER — LAB (OUTPATIENT)
Dept: LAB | Facility: CLINIC | Age: 29
End: 2024-02-23
Payer: COMMERCIAL

## 2024-02-23 DIAGNOSIS — Z34.82 ENCOUNTER FOR SUPERVISION OF OTHER NORMAL PREGNANCY IN SECOND TRIMESTER: ICD-10-CM

## 2024-02-23 LAB — GLUCOSE 1H P 50 G GLC PO SERPL-MCNC: 62 MG/DL (ref 70–129)

## 2024-02-23 PROCEDURE — 82950 GLUCOSE TEST: CPT

## 2024-02-23 PROCEDURE — 36415 COLL VENOUS BLD VENIPUNCTURE: CPT

## 2024-03-12 ENCOUNTER — PRENATAL OFFICE VISIT (OUTPATIENT)
Dept: FAMILY MEDICINE | Facility: CLINIC | Age: 29
End: 2024-03-12
Payer: COMMERCIAL

## 2024-03-12 VITALS
HEART RATE: 70 BPM | HEIGHT: 69 IN | OXYGEN SATURATION: 99 % | SYSTOLIC BLOOD PRESSURE: 115 MMHG | DIASTOLIC BLOOD PRESSURE: 57 MMHG | RESPIRATION RATE: 16 BRPM | BODY MASS INDEX: 25.62 KG/M2 | WEIGHT: 173 LBS

## 2024-03-12 DIAGNOSIS — Z34.83 ENCOUNTER FOR SUPERVISION OF OTHER NORMAL PREGNANCY IN THIRD TRIMESTER: Primary | ICD-10-CM

## 2024-03-12 PROBLEM — O99.891 PAIN IN SYMPHYSIS PUBIS DURING PREGNANCY: Status: RESOLVED | Noted: 2021-12-06 | Resolved: 2024-03-12

## 2024-03-12 PROBLEM — M79.18 PAIN IN SYMPHYSIS PUBIS DURING PREGNANCY: Status: RESOLVED | Noted: 2021-12-06 | Resolved: 2024-03-12

## 2024-03-12 PROBLEM — Z36.89 ENCOUNTER FOR TRIAGE IN PREGNANT PATIENT: Status: RESOLVED | Noted: 2022-01-23 | Resolved: 2024-03-12

## 2024-03-12 LAB — HGB BLD-MCNC: 13.1 G/DL (ref 11.7–15.7)

## 2024-03-12 PROCEDURE — 36415 COLL VENOUS BLD VENIPUNCTURE: CPT | Performed by: FAMILY MEDICINE

## 2024-03-12 PROCEDURE — 85018 HEMOGLOBIN: CPT | Performed by: FAMILY MEDICINE

## 2024-03-12 PROCEDURE — 86780 TREPONEMA PALLIDUM: CPT | Performed by: FAMILY MEDICINE

## 2024-03-12 PROCEDURE — 90471 IMMUNIZATION ADMIN: CPT | Performed by: FAMILY MEDICINE

## 2024-03-12 PROCEDURE — 90715 TDAP VACCINE 7 YRS/> IM: CPT | Performed by: FAMILY MEDICINE

## 2024-03-12 PROCEDURE — 99207 PR PRENATAL VISIT: CPT | Performed by: FAMILY MEDICINE

## 2024-03-12 NOTE — PROGRESS NOTES
"Ansley Rosario is a 28 year old  female who presents to clinic for a follow up OB visit. She is currently 29w2d with an estimated date of delivery of May 26, 2024 via LMP consistent with 8-week ultrasound. She denies headache, chest pain, shortness of breath, abdominal pain/contractions, vaginal bleeding, or abnormal discharge. She continues to feel baby move.     New concerns today: Transferring care from [facility].  Prenatal records reviewed.    OB History    Para Term  AB Living   2 1 1 0 0 1   SAB IAB Ectopic Multiple Live Births   0 0 0 0 1      # Outcome Date GA Lbr Khari/2nd Weight Sex Delivery Anes PTL Lv   2 Current            1 Term 22 39w3d / 01:10 2.92 kg (6 lb 7 oz) M Vag-Spont EPI N NETTA      Name: VAL,MALE-ANSLEY      Apgar1: 9  Apgar5: 9       Physical exam:  /57   Pulse 70   Resp 16   Ht 1.753 m (5' 9\")   Wt 78.5 kg (173 lb)   LMP 2023   SpO2 99%   Breastfeeding No   BMI 25.55 kg/m      General: alert, female in no acute distress  Abdomen: gravid uterus appropriate for gestation age at 28 cm above pubic symphysis, non-tender, FHTs 145  Extremities: no edema    Prenatal labs  Blood type: A+  Hgb: 14.4  Pap: nml 3/3/22  Hep B, HIV, syphilis, gonorrhea, chlamydia, HIV negative  Rubella immune  UA/Ucx normal  NIPS nml, boy!  Passed 1 hr GTT    Assessment/Plan:  1. Encounter for supervision of other normal pregnancy in third trimester  Care from [facility].  Prenatal records reviewed.  Recently passed 1 hour GTT.  Update hemoglobin and second syphilis today.  Agreeable to Tdap vaccination.  Plans to breast-feed.  Considering nonpharmacologic options for pain control during delivery, previously had epidural.    Plan for every 2-week visit until 36 weeks gestation and then weekly    Isabel Beckham,          "

## 2024-03-12 NOTE — PATIENT INSTRUCTIONS
[facility] Information  If you have any further concerns or wish to schedule another appointment, please call our office at [phone].  Call your doctor if you experience any bleeding or abdominal cramping early in pregnancy.     For uncomplicated pregnancies, you will be seeing your doctor once a month until you are 28 weeks, then you will see your doctor twice a month. You will begin weekly visits at 36 weeks until you deliver.     If you have a medical emergency, please call 601.    [facility] Labor and Delivery: [phone]  [facility] Labor and Delivery: [phone]    When to call or come in to the hospital  If you notice a decrease in your baby's movement.   If your begin to experience contractions that are 5 minutes or less apart and lasting for over 45 seconds, or if contractions are becoming more painful.  If you have any bleeding or leakage of fluids.   If you have a headache not resolved with tylenol, right upper abdominal pain, or sudden onset of swelling.  You know your body best. Never hesitate to call or go to the hospital if something doesn't feel right!  After-baby Birth Control Methods   It is important to consider contraception after your baby is born if you would like to prevent a pregnancy in the near future. If you are breast feeding, talk with your doctor to determine the best method for you. It is recommended that you wait 12 months after the birth of your baby to get pregnant again.   Condoms   Latex condoms can prevent pregnancy and STDs. Condoms work best when used with spermicide that is placed inside the vagina as well as inside the condom. Use only water-based lubricants. Petroleum based products (such as Vaseline and many massage oils) can weaken the latex and cause it to break.   Iud   IUD's are made of flexible plastic and must be inserted into your uterus by a doctor. The IUD works by stopping the fertilized egg from attaching itself to the uterus. IUDs may increase  the risk of getting a pelvic infection (PID), which can lead to infertility if not diagnosed and treated early. This is a great option after delivery of your baby! These last for 3, 5, or 10 years depending up on which type you choose, but can be removed earlier if you decide you would like to get pregnant sooner.  Tubal Ligation & Vasectomy   These are good choices for women and men who know that they do not want to have any more children.     HORMONES   Birth control pills, hormone implants and shots work by stopping ovulation (release of the egg from the ovary). Implants are placed in the upper arm by a minor surgical incision. They last for five years, but can be removed by your doctor if you decide to get pregnant. Hormone injections must be repeated every three months. The Pill must be taken every day.   All hormones can have side effects and create certain health risks. They are very effective in preventing pregnancy but they do not prevent STDs. You can talk more about the risks and benefits with your doctor.

## 2024-03-13 LAB — T PALLIDUM AB SER QL: NONREACTIVE

## 2024-03-20 ENCOUNTER — PRENATAL OFFICE VISIT (OUTPATIENT)
Dept: FAMILY MEDICINE | Facility: CLINIC | Age: 29
End: 2024-03-20
Payer: COMMERCIAL

## 2024-03-20 VITALS
BODY MASS INDEX: 25.48 KG/M2 | DIASTOLIC BLOOD PRESSURE: 54 MMHG | HEIGHT: 69 IN | HEART RATE: 81 BPM | SYSTOLIC BLOOD PRESSURE: 104 MMHG | WEIGHT: 172 LBS | OXYGEN SATURATION: 100 % | RESPIRATION RATE: 16 BRPM

## 2024-03-20 DIAGNOSIS — Z34.83 ENCOUNTER FOR SUPERVISION OF OTHER NORMAL PREGNANCY IN THIRD TRIMESTER: Primary | ICD-10-CM

## 2024-03-20 PROCEDURE — 99207 PR PRENATAL VISIT: CPT | Performed by: FAMILY MEDICINE

## 2024-03-20 NOTE — PROGRESS NOTES
"Ansley Rosario is a 28 year old  female who presents to clinic for a follow up OB visit. She is currently 30w3d with an estimated date of delivery of May 26, 2024 via LMP c/w 8 week US. She denies headache, chest pain, shortness of breath, abdominal pain, vaginal bleeding, or abnormal discharge. She continues to feel baby move.     New concerns today: Occasional [place], very active on feet, feels she likely is not drinking enough water.  Keep caffeine under 100 mg/day    OB History    Para Term  AB Living   2 1 1 0 0 1   SAB IAB Ectopic Multiple Live Births   0 0 0 0 1      # Outcome Date GA Lbr Khari/2nd Weight Sex Delivery Anes PTL Lv   2 Current            1 Term 22 39w3d / 01:10 2.92 kg (6 lb 7 oz) M Vag-Spont EPI N NETTA      Name: VAL,MALE-ANSLEY      Apgar1: 9  Apgar5: 9       Physical exam:  /54   Pulse 81   Resp 16   Ht 1.753 m (5' 9\")   Wt 78 kg (172 lb)   LMP 2023   SpO2 100%   BMI 25.40 kg/m      General: alert, female in no acute distress  Abdomen: gravid uterus appropriate for gestation age at 29 cm above pubic symphysis, non-tender, FHTs 150  Extremities: no edema    Prenatal labs  Blood type: A+  Hgb: 14.4  Pap: nml 3/3/22  Hep B, HIV, syphilis, gonorrhea, chlamydia, HIV negative  Rubella immune  UA/Ucx normal  NIPS nml, boy!  Passed 1 hr GTT  TDAP 3/12/2024, declined COVID, declined influenza    Assessment/Plan:  1. Encounter for supervision of other normal pregnancy in third trimester  Recommend increasing water intake, resting if experiencing [place].  Discussed when to call/come in.   Follow up in 2 weeks for routine prenatal visit.     Isabel Beckham DO         "

## 2024-04-08 ENCOUNTER — PRENATAL OFFICE VISIT (OUTPATIENT)
Dept: FAMILY MEDICINE | Facility: CLINIC | Age: 29
End: 2024-04-08
Payer: COMMERCIAL

## 2024-04-08 VITALS
SYSTOLIC BLOOD PRESSURE: 106 MMHG | HEIGHT: 69 IN | BODY MASS INDEX: 26.51 KG/M2 | WEIGHT: 179 LBS | DIASTOLIC BLOOD PRESSURE: 59 MMHG | RESPIRATION RATE: 16 BRPM | OXYGEN SATURATION: 100 % | HEART RATE: 77 BPM

## 2024-04-08 DIAGNOSIS — Z34.83 ENCOUNTER FOR SUPERVISION OF OTHER NORMAL PREGNANCY IN THIRD TRIMESTER: Primary | ICD-10-CM

## 2024-04-08 PROCEDURE — 99207 PR PRENATAL VISIT: CPT | Performed by: FAMILY MEDICINE

## 2024-04-08 NOTE — PROGRESS NOTES
"Ansley Rosario is a 29 year old  female who presents to clinic for a follow up OB visit. She is currently 33w1d with an estimated date of delivery of May 26, 2024 via 8-week ultrasound. She denies headache, chest pain, shortness of breath, abdominal pain, vaginal bleeding, or abnormal discharge. She continues to feel baby move.     New concerns today: none, [place] approximately 2/day.  Does not need to breathe through them.    OB History    Para Term  AB Living   2 1 1 0 0 1   SAB IAB Ectopic Multiple Live Births   0 0 0 0 1      # Outcome Date GA Lbr Khari/2nd Weight Sex Delivery Anes PTL Lv   2 Current            1 Term 22 39w3d / 01:10 2.92 kg (6 lb 7 oz) M Vag-Spont EPI N NETTA      Name: VAL,MALE-ANSLEY      Apgar1: 9  Apgar5: 9       Physical exam:  /59   Pulse 77   Resp 16   Ht 1.753 m (5' 9\")   Wt 81.2 kg (179 lb)   LMP 2023   SpO2 100%   BMI 26.43 kg/m      General: alert, female in no acute distress  Abdomen: gravid uterus appropriate for gestation age at 32 cm above pubic symphysis, non-tender, FHTs 145  Extremities: no edema    Prenatal labs  Blood type: A+  Hgb: 14.4  Pap: nml 3/3/22  Hep B, HIV, syphilis, gonorrhea, chlamydia, HIV negative  Rubella immune  UA/Ucx normal  NIPS nml, boy! Desires circumcision  Passed 1 hr GTT  TDAP 3/12/2024, influenza 23, declined COVID  Considering nonpharmacologic options for pain control during delivery, previously had epidural.   Plans to breastfeed    Assessment/Plan:  1. Encounter for supervision of other normal pregnancy in third trimester  Discussed pre-term labor signs and symptoms and when to call/come in.  Declined updating COVID booster.  Plan for GBS at 36 weeks.    Follow up in 2 weeks for routine prenatal care.    Isabel Beckham, DO       "

## 2024-04-23 ENCOUNTER — PRENATAL OFFICE VISIT (OUTPATIENT)
Dept: FAMILY MEDICINE | Facility: CLINIC | Age: 29
End: 2024-04-23
Payer: COMMERCIAL

## 2024-04-23 VITALS
RESPIRATION RATE: 16 BRPM | WEIGHT: 180 LBS | HEIGHT: 69 IN | SYSTOLIC BLOOD PRESSURE: 100 MMHG | OXYGEN SATURATION: 100 % | HEART RATE: 85 BPM | BODY MASS INDEX: 26.66 KG/M2 | DIASTOLIC BLOOD PRESSURE: 54 MMHG

## 2024-04-23 DIAGNOSIS — Z34.83 ENCOUNTER FOR SUPERVISION OF OTHER NORMAL PREGNANCY IN THIRD TRIMESTER: Primary | ICD-10-CM

## 2024-04-23 PROCEDURE — 99207 PR PRENATAL VISIT: CPT | Performed by: FAMILY MEDICINE

## 2024-04-23 NOTE — PROGRESS NOTES
"Ansley Rosario is a 29 year old  female who presents to clinic for a follow up OB visit. She is currently 35w2d with an estimated date of delivery of May 26, 2024 via 8-week ultrasound. She denies headache, chest pain, shortness of breath, abdominal pain, vaginal bleeding, or abnormal discharge. She continues to feel baby move.     New concerns today: none    OB History    Para Term  AB Living   2 1 1 0 0 1   SAB IAB Ectopic Multiple Live Births   0 0 0 0 1      # Outcome Date GA Lbr Khari/2nd Weight Sex Type Anes PTL Lv   2 Current            1 Term 22 39w3d / 01:10 2.92 kg (6 lb 7 oz) M Vag-Spont EPI N NETTA      Name: VALMALE-ANSLEY      Apgar1: 9  Apgar5: 9       Physical exam:  /54   Pulse 85   Resp 16   Ht 1.753 m (5' 9\")   Wt 81.6 kg (180 lb)   LMP 2023   SpO2 100%   BMI 26.58 kg/m      General: alert, female in no acute distress  Abdomen: gravid uterus appropriate for gestation age at 35 cm above pubic symphysis, non-tender, FHTs 140  Extremities: no edema    Prenatal labs  Blood type: A+  Hgb: 14.4  Pap: nml 3/3/22  Hep B, HIV, syphilis, gonorrhea, chlamydia, HIV negative  Rubella immune  UA/Ucx normal  NIPS nml, boy! Desires circumcision  Passed 1 hr GTT  TDAP 3/12/2024, influenza 23, declined COVID  Considering nonpharmacologic options for pain control during delivery, previously had epidural.   Plans to breastfeed    Assessment/Plan:  1. Encounter for supervision of other normal pregnancy in third trimester  Discussed pre-term labor signs and symptoms and when to call/come in. Plan for GBS and repeat hemoglobin at 36 weeks.    Follow up in 1 week for routine prenatal care.    Isabel Beckham DO       "

## 2024-05-02 ENCOUNTER — PRENATAL OFFICE VISIT (OUTPATIENT)
Dept: FAMILY MEDICINE | Facility: CLINIC | Age: 29
End: 2024-05-02
Payer: COMMERCIAL

## 2024-05-02 VITALS
HEIGHT: 69 IN | OXYGEN SATURATION: 100 % | BODY MASS INDEX: 26.69 KG/M2 | TEMPERATURE: 98.6 F | RESPIRATION RATE: 16 BRPM | HEART RATE: 82 BPM | DIASTOLIC BLOOD PRESSURE: 58 MMHG | SYSTOLIC BLOOD PRESSURE: 109 MMHG | WEIGHT: 180.2 LBS

## 2024-05-02 DIAGNOSIS — Z34.83 ENCOUNTER FOR SUPERVISION OF OTHER NORMAL PREGNANCY IN THIRD TRIMESTER: Primary | ICD-10-CM

## 2024-05-02 LAB — HGB BLD-MCNC: 13 G/DL (ref 11.7–15.7)

## 2024-05-02 PROCEDURE — 99207 PR PRENATAL VISIT: CPT | Performed by: FAMILY MEDICINE

## 2024-05-02 PROCEDURE — 36415 COLL VENOUS BLD VENIPUNCTURE: CPT | Performed by: FAMILY MEDICINE

## 2024-05-02 PROCEDURE — 85018 HEMOGLOBIN: CPT | Performed by: FAMILY MEDICINE

## 2024-05-02 PROCEDURE — 87653 STREP B DNA AMP PROBE: CPT | Performed by: FAMILY MEDICINE

## 2024-05-02 NOTE — PROGRESS NOTES
"Ansley Rosario is a 29 year old  female who presents to clinic for a follow up OB visit. She is currently 36w4d with an estimated date of delivery of May 26, 2024 via 8-week ultrasound. She denies headache, chest pain, shortness of breath, abdominal pain, vaginal bleeding, or abnormal discharge. She continues to feel baby move.     New concerns today: none    OB History    Para Term  AB Living   2 1 1 0 0 1   SAB IAB Ectopic Multiple Live Births   0 0 0 0 1      # Outcome Date GA Lbr Khari/2nd Weight Sex Type Anes PTL Lv   2 Current            1 Term 22 39w3d / 01:10 2.92 kg (6 lb 7 oz) M Vag-Spont EPI N NETTA      Name: VALMALE-ANSLEY      Apgar1: 9  Apgar5: 9       Physical exam:  /58 (BP Location: Left arm, Patient Position: Sitting, Cuff Size: Adult Regular)   Pulse 82   Temp 98.6  F (37  C) (Oral)   Resp 16   Ht 1.753 m (5' 9\")   Wt 81.7 kg (180 lb 3.2 oz)   LMP 2023   SpO2 100%   BMI 26.61 kg/m      General: alert, female in no acute distress  Abdomen: gravid uterus appropriate for gestation age at 35 cm above pubic symphysis, non-tender, FHTs 125, vertex on Leopold's maneuver  Extremities: no edema    Prenatal labs  Blood type: A+  Hgb: 14.4-->  Pap: nml 3/3/22  Hep B, HIV, syphilis, gonorrhea, chlamydia, HIV negative  Rubella immune  UA/Ucx normal  NIPS nml, boy! Desires circumcision  Passed 1 hr GTT  TDAP 3/12/2024, influenza 23, declined COVID  Considering nonpharmacologic options for pain control during delivery, previously had epidural.   Plans to breastfeed    Assessment/Plan:  1. Encounter for supervision of other normal pregnancy in third trimester  Discussed labor signs and symptoms and when to call/come in. GBS and repeat hemoglobin today.    Follow up in 1 week for routine prenatal care.    Isabel Beckham, DO       "

## 2024-05-02 NOTE — PATIENT INSTRUCTIONS
[facility] Information  If you have any further concerns or wish to schedule another appointment, please call our office at [phone].  Call your doctor if you experience any bleeding or abdominal cramping early in pregnancy.     For uncomplicated pregnancies, you will be seeing your doctor once a month until you are 28 weeks, then you will see your doctor twice a month. You will begin weekly visits at 36 weeks until you deliver.     If you have a medical emergency, please call 911.    [facility] Labor and Delivery: [phone]  [facility] Labor and Delivery: [phone]    When to call or come in to the hospital  If you notice a decrease in your baby's movement.   If your begin to experience contractions that are 5 minutes or less apart and lasting for over 45 seconds, or if contractions are becoming more painful.  If you have any bleeding or leakage of fluids.   If you have a headache not resolved with tylenol, right upper abdominal pain, or sudden onset of swelling.  You know your body best. Never hesitate to call or go to the hospital if something doesn't feel right!    Preparing for the hospital  You re likely feeling anxious as your child s birth approaches. This is normal. To give yourself some peace of mind, pack a bag 3-4 weeks before your due date. Here is a list of things to remember:  Personal care items like toothbrush, hair brush, lip balm, lotion, shampoo, glasses, contacts  Nightgown, bathrobe, slippers  Several pairs of underwear  Comfortable clothes for you to wear home  Camera with new batteries  Cell phone and   Insurance information and any other paperwork needed for your hospital stay  Clothes for baby to wear home  An infant, rear-facing car seat for bringing home your baby (this is required by law)  What Is Group B Strep?   Group B strep (streptococcus) is a common bacteria. It can grow in a woman s vagina, rectum, or urinary tract. It is almost always harmless in  adults. But in rare cases, a woman who has group B strep can infect her baby during the birth. Infection can cause serious illness in the . The good news is that treating the mother during labor reduces the risk of the baby becoming infected. And if a  is infected, the infection can be treated. You will be screened for Group B strep at 35-36 weeks gestation.  Facts about group B strep   Learning more about group B strep can help you understand how testing and treatment can help. Here are some basic facts about group B strep:   It is not a sexually transmitted disease.   It is not the same as strep throat. (This is caused by group A strep.)   It often has no symptoms and may cause no problems in adults.   Group B strep can be transmitted during vaginal delivery. It cannot be passed during  (surgical) birth.   A mother with group B strep rarely infects her . (Infection occurs only about 1% to 2% of the time.)   When a mother is treated during labor and delivery, her baby almost never becomes infected.   Certain factors during pregnancy increase the risk of a baby becoming infected.  Possible effects on your baby   Group B strep can infect the blood. It can also cause inflammation of the baby s lungs, brain, or spinal cord. Long-term effects can include blindness, deafness, mental retardation, or cerebral palsy. And in rare cases, infection causes death. Infection is most often detected soon after the baby is born.   How your baby may become infected   Group B strep often lives in the vagina or rectum. If the amniotic sac breaks early, bacteria from the vagina can travel to the uterus, reaching the baby. Or, as the baby passes through the birth canal, it can come in contact with the bacteria. In rare cases, group B strep can also be passed to the baby after delivery. This is called late-onset group B strep. The source of this type of infection is not well understood. But some experts  believe that it happens if the baby is exposed to group B strep in the home, from the parents or siblings, or in the community.   What increases the risk?   Certain risk factors increase the chance that a baby will be infected. They include:   Breaking or leaking of the amniotic sac earlier than 37 weeks gestation   Labor earlier than 37 weeks gestation   Breaking of the amniotic sac more than 18 hours before labor begins   Fever during labor   A urinary tract infection with group B strep at any point in the pregnancy   A previous baby born with a group B strep infection    BaBaby Feeding in the Hospital: Information, Support and      Resources    As As you prepare for the birth of your child, you will want to consider options for feeding your baby cluding breast-feeding and/or baby formula. The American Academy of Pediatrics recommends exclusive breast-feeding for the first six months (although any amount of breast-feeding is beneficial).  However, we also understand that breast-feeding is  a personal choice and not for everyone. Whether or not you choose to breast-feed, your decision will be respected by our staff.        There are numerous benefits of breast-feeding; here are a few to consider:  Provides antibodies to protect your baby from infections and diseases  The cost: formula can cost over $1,500 per year  Convenience, no warming up or sterilizing bottles and supplies  The physical contact with breastfeeding can make babies feel secure, warm and comforted        What ever my feeding choice, what can I expect after I deliver my baby?  Your baby will usually be placed skin-to-skin immediately following birth. The skin to skin contact between you and your baby will be a special and memorable time. The bonding and attachment comforts your baby and has a positive effect on baby s brain development.   Having your baby  room in  with you also helps you start to learn your baby s body rhythms and sleep cycle.     You will also begin to learn your baby s cues (signals) that he or she is ready to feed.        When do I start to feed my baby?         As soon as possible after your baby s birth, you will be encouraged to begin feeding. In the first couple of weeks, your baby will eat often. Breastfeeding babies usually eat at least 8 times in 24 hours. Babies fed formula usually eat at least 7 times in 24 hours.           Breast-feeding tips:  Get comfortable and use pillows for support.  Have your baby at the level of your breast, facing you,  tummy to tummy.     Touch your nipple to your baby s lips so your baby s mouth opens wide (rooting reflex).  Aim the nipple toward the roof of your baby s mouth. When your baby opens his or her mouth, pull your baby toward your breast to help your baby  latch on  to your nipple and much of the areola area.  Hand expressing your breast milk can assist with latching your baby to your breast, if needed.  Ask for help, breastfeeding may seem awkward or uncomfortable at first, this is normal. There are numerous resources available at [facility], clinics and beyond.   If your goal is to exclusively breastfeed, avoid using any formula or artificial nipples (including bottles and pacifiers) while you and your baby are learning to breastfeed unless there is a medical reason.     Mixing breastfeeding and formula can interfere with how you begin building your milk supply. It can impact how you and your baby learn to breastfeeding together and alter the natural growth of  good  bacteria in your baby s stomach.  Breast-feeding tips (cont.)  Delay a pacifier or a bottle in the first few weeks until breastfeeding is well established. This is often around 3 weeks of age.  Ask your nurse to show you how to hand express. Breast milk can be kept in the refrigerator orfreezer for later use.     Hospital Resources:  [facility] located at [facility], [facility]  and [facility]  Call: [phone].  Inpatient support  Outpatient appointments  Telephone consultation  Breast-feeding classes available through [employer]      Online Resources:  [email or web address] sign up for free online weekly e-mail  [email or web address]  Breastfeedingmadesimple.com  fintonicli.Button (La Leche League)  Normalfed.com  [email or web address]  Workandpump.com    Breast-feeding Supplies & Pumps:  Talk to your insurance provider or WIC (Women, Infants and Children) to learn more about options available to you. Recent health insurance changes may include additional coverage for supplies and pumps.    Public Health:  Women, Infants and Children Nutrition program (WIC): provides breast-feeding support and education in addition to formal feeding moms. 145-SDL-2574 or [email or web address]/fh/wic    Family Health Home Visiting: [facility] Nurse home visits are available. Talk to your provider to see if you qualify. Most [facility] have a program available.    Additional Resources:  La Leche League is an international, nonprofit, nonsectarian organization offering information, education, and support to mothers who want to breast-feed their babies. Local groups offer phone help and monthly meetings. Visit [employer].Button or [employer] and us the  Find local support  drop down menu or click on the  Resources  tab.    [place] Breastfeeding Resources: 7-655-451-BABY (0424) toll free    National Breastfeeding Help Line trained breastfeeding peer counselors can help answer common breast-feeding questions by phone. Monday-Friday: English/British  6-754- 313-7735 toll free, 1-631.992.9782 (TTY)    Care Connection: 920-112-[facility] (5424)

## 2024-05-03 LAB — GP B STREP DNA SPEC QL NAA+PROBE: POSITIVE

## 2024-05-09 ENCOUNTER — PRENATAL OFFICE VISIT (OUTPATIENT)
Dept: FAMILY MEDICINE | Facility: CLINIC | Age: 29
End: 2024-05-09
Payer: COMMERCIAL

## 2024-05-09 VITALS
HEART RATE: 67 BPM | BODY MASS INDEX: 26.75 KG/M2 | SYSTOLIC BLOOD PRESSURE: 114 MMHG | DIASTOLIC BLOOD PRESSURE: 63 MMHG | WEIGHT: 181.13 LBS

## 2024-05-09 DIAGNOSIS — Z34.83 ENCOUNTER FOR SUPERVISION OF OTHER NORMAL PREGNANCY IN THIRD TRIMESTER: Primary | ICD-10-CM

## 2024-05-09 DIAGNOSIS — B95.1 POSITIVE GBS TEST: ICD-10-CM

## 2024-05-09 PROCEDURE — 99207 PR PRENATAL VISIT: CPT | Performed by: FAMILY MEDICINE

## 2024-05-09 NOTE — PROGRESS NOTES
Ansley Rosario is a 29 year old  female who presents to clinic for a follow up OB visit. She is currently 37w4d with an estimated date of delivery of May 26, 2024 via 8-week ultrasound. She denies headache, chest pain, shortness of breath, abdominal pain, vaginal bleeding, or abnormal discharge. She continues to feel baby move.     New concerns today: none    OB History    Para Term  AB Living   2 1 1 0 0 1   SAB IAB Ectopic Multiple Live Births   0 0 0 0 1      # Outcome Date GA Lbr Khari/2nd Weight Sex Type Anes PTL Lv   2 Current            1 Term 22 39w3d / 01:10 2.92 kg (6 lb 7 oz) M Vag-Spont EPI N NETTA      Name: VALMALE-ANSLEY      Apgar1: 9  Apgar5: 9       Physical exam:  /63 (BP Location: Left arm, Patient Position: Sitting, Cuff Size: Adult Regular)   Pulse 67   Wt 82.2 kg (181 lb 2 oz)   LMP 2023   BMI 26.75 kg/m      General: alert, female in no acute distress  Abdomen: gravid uterus appropriate for gestation age at 36 cm above pubic symphysis, non-tender, FHTs 145, vertex on bedside US  GYN: 0.5/10/-3  Extremities: no edema    Prenatal labs  Blood type: A+  Hgb: 14.4-->13  Pap: nml 3/3/22  Hep B, HIV, syphilis, gonorrhea, chlamydia, HIV negative  Rubella immune  UA/Ucx normal  NIPS nml, boy! Desires circumcision  Passed 1 hr GTT  TDAP 3/12/2024, influenza 23, declined COVID  Prefers nonpharmacologic options for pain control during delivery. No morphine. May consider nitrous.   Plans to breastfeed  GBS positive    Assessment/Plan:  1. Encounter for supervision of other normal pregnancy in third trimester  Discussed labor signs and symptoms and when to call/come in.     2. Positive GBS test  Plan for antibiotics during labor.       Follow up in 1 week for routine prenatal care.    Isabel Beckham DO

## 2024-05-14 ENCOUNTER — PRENATAL OFFICE VISIT (OUTPATIENT)
Dept: FAMILY MEDICINE | Facility: CLINIC | Age: 29
End: 2024-05-14
Payer: COMMERCIAL

## 2024-05-14 VITALS
SYSTOLIC BLOOD PRESSURE: 108 MMHG | HEART RATE: 78 BPM | WEIGHT: 180 LBS | DIASTOLIC BLOOD PRESSURE: 65 MMHG | BODY MASS INDEX: 26.66 KG/M2 | OXYGEN SATURATION: 99 % | RESPIRATION RATE: 16 BRPM | HEIGHT: 69 IN

## 2024-05-14 DIAGNOSIS — B95.1 POSITIVE GBS TEST: ICD-10-CM

## 2024-05-14 DIAGNOSIS — Z34.83 ENCOUNTER FOR SUPERVISION OF OTHER NORMAL PREGNANCY IN THIRD TRIMESTER: Primary | ICD-10-CM

## 2024-05-14 PROCEDURE — 99207 PR PRENATAL VISIT: CPT | Performed by: FAMILY MEDICINE

## 2024-05-14 NOTE — PROGRESS NOTES
"Ansley Rosario is a 29 year old  female who presents to clinic for a follow up OB visit. She is currently 38w2d with an estimated date of delivery of May 26, 2024 via 8-week ultrasound. She denies headache, chest pain, shortness of breath, abdominal pain, vaginal bleeding, or abnormal discharge. She continues to feel baby move.     New concerns today: none    OB History    Para Term  AB Living   2 1 1 0 0 1   SAB IAB Ectopic Multiple Live Births   0 0 0 0 1      # Outcome Date GA Lbr Khari/2nd Weight Sex Type Anes PTL Lv   2 Current            1 Term 22 39w3d / 01:10 2.92 kg (6 lb 7 oz) M Vag-Spont EPI N NETTA      Name: VALMALE-ANSLEY      Apgar1: 9  Apgar5: 9       Physical exam:  /65   Pulse 78   Resp 16   Ht 1.753 m (5' 9\")   Wt 81.6 kg (180 lb)   LMP 2023   SpO2 99%   BMI 26.58 kg/m      General: alert, female in no acute distress  Abdomen: gravid uterus appropriate for gestation age at 36 cm above pubic symphysis, non-tender, FHTs 145, vertex on bedside US  GYN: 0.5/10/-3  Extremities: no edema    Prenatal labs  Blood type: A+  Hgb: 14.4-->13  Pap: nml 3/3/22  Hep B, HIV, syphilis, gonorrhea, chlamydia, HIV negative  Rubella immune  UA/Ucx normal  NIPS nml, boy! Desires circumcision  Passed 1 hr GTT  TDAP 3/12/2024, influenza 23, declined COVID  Prefers nonpharmacologic options for pain control during delivery. No morphine. May consider nitrous.   Plans to breastfeed  GBS positive    Assessment/Plan:  1. Encounter for supervision of other normal pregnancy in third trimester  Discussed labor signs and symptoms and when to call/come in.     2. Positive GBS test  Plan for antibiotics during labor.       Follow up in 1 week for routine prenatal care.    Isabel Beckham DO       "

## 2024-05-14 NOTE — PATIENT INSTRUCTIONS
[facility] Information  If you have any further concerns or wish to schedule another appointment, please call our office at [phone].  Call your doctor if you experience any bleeding or abdominal cramping early in pregnancy.     For uncomplicated pregnancies, you will be seeing your doctor once a month until you are 28 weeks, then you will see your doctor twice a month. You will begin weekly visits at 36 weeks until you deliver.     If you have a medical emergency, please call 911.    [facility] Labor and Delivery: [phone]  [facility] Labor and Delivery: [phone]    When to call or come in to the hospital  If you notice a decrease in your baby's movement.   If your begin to experience contractions that are 5 minutes or less apart and lasting for over 45 seconds, or if contractions are becoming more painful.  If you have any bleeding or leakage of fluids.   If you have a headache not resolved with tylenol, right upper abdominal pain, or sudden onset of swelling.  You know your body best. Never hesitate to call or go to the hospital if something doesn't feel right!    Preparing for the hospital  You re likely feeling anxious as your child s birth approaches. This is normal. To give yourself some peace of mind, pack a bag 3-4 weeks before your due date. Here is a list of things to remember:  Personal care items like toothbrush, hair brush, lip balm, lotion, shampoo, glasses, contacts  Nightgown, bathrobe, slippers  Several pairs of underwear  Comfortable clothes for you to wear home  Camera with new batteries  Cell phone and   Insurance information and any other paperwork needed for your hospital stay  Clothes for baby to wear home  An infant, rear-facing car seat for bringing home your baby (this is required by law)

## 2024-05-21 ENCOUNTER — PRENATAL OFFICE VISIT (OUTPATIENT)
Dept: FAMILY MEDICINE | Facility: CLINIC | Age: 29
End: 2024-05-21
Payer: COMMERCIAL

## 2024-05-21 VITALS
OXYGEN SATURATION: 99 % | SYSTOLIC BLOOD PRESSURE: 108 MMHG | DIASTOLIC BLOOD PRESSURE: 63 MMHG | RESPIRATION RATE: 16 BRPM | BODY MASS INDEX: 26.66 KG/M2 | HEIGHT: 69 IN | HEART RATE: 75 BPM | WEIGHT: 180 LBS

## 2024-05-21 DIAGNOSIS — O26.843 UTERINE SIZE-DATE DISCREPANCY IN THIRD TRIMESTER: ICD-10-CM

## 2024-05-21 DIAGNOSIS — B95.1 POSITIVE GBS TEST: ICD-10-CM

## 2024-05-21 DIAGNOSIS — Z34.83 ENCOUNTER FOR SUPERVISION OF OTHER NORMAL PREGNANCY IN THIRD TRIMESTER: Primary | ICD-10-CM

## 2024-05-21 PROCEDURE — 99207 PR PRENATAL VISIT: CPT | Performed by: FAMILY MEDICINE

## 2024-05-21 NOTE — PROGRESS NOTES
"Ansley Rosario is a 29 year old  female who presents to clinic for a follow up OB visit. She is currently 39w2d with an estimated date of delivery of May 26, 2024 via 8-week ultrasound. She denies headache, chest pain, shortness of breath, abdominal pain, vaginal bleeding, or abnormal discharge. She continues to feel baby move.     New concerns today: none    OB History    Para Term  AB Living   2 1 1 0 0 1   SAB IAB Ectopic Multiple Live Births   0 0 0 0 1      # Outcome Date GA Lbr Khari/2nd Weight Sex Type Anes PTL Lv   2 Current            1 Term 22 39w3d / 01:10 2.92 kg (6 lb 7 oz) M Vag-Spont EPI N NETTA      Name: VALMALE-ANSLEY      Apgar1: 9  Apgar5: 9       Physical exam:  /63   Pulse 75   Resp 16   Ht 1.753 m (5' 9\")   Wt 81.6 kg (180 lb)   LMP 2023   SpO2 99%   BMI 26.58 kg/m      General: alert, female in no acute distress  Abdomen: gravid uterus appropriate for gestation age at 36 cm above pubic symphysis, non-tender, FHTs 140, vertex on bedside US  GYN: Posterior cervix, unable to palpate  Extremities: no edema    Prenatal labs  Blood type: A+  Hgb: 14.4-->13  Pap: nml 3/3/22  Hep B, HIV, syphilis, gonorrhea, chlamydia, HIV negative  Rubella immune  UA/Ucx normal  NIPS nml, boy! Desires circumcision  Passed 1 hr GTT  TDAP 3/12/2024, influenza 23, declined COVID  Prefers nonpharmacologic options for pain control during delivery. No morphine. May consider nitrous.   Plans to breastfeed  GBS positive    Assessment/Plan:  1. Encounter for supervision of other normal pregnancy in third trimester  Discussed labor signs and symptoms and when to call/come in.     2. Positive GBS test  Plan for antibiotics during labor.     3. Uterine size-date discrepancy in third trimester  Plateaued growth, measuring small for dates.  Reviewed options, will pursue growth ultrasound in case this may impact induction decision.  - US OB > 14 Weeks; Future        Follow up in " 1 week for routine prenatal care.    Isabel Beckham DO

## 2024-05-21 NOTE — PATIENT INSTRUCTIONS
[facility] Information  If you have any further concerns or wish to schedule another appointment, please call our office at [phone].  Call your doctor if you experience any bleeding or abdominal cramping early in pregnancy.     For uncomplicated pregnancies, you will be seeing your doctor once a month until you are 28 weeks, then you will see your doctor twice a month. You will begin weekly visits at 36 weeks until you deliver.     If you have a medical emergency, please call 911.    [facility] Labor and Delivery: [phone]  [facility] Labor and Delivery: [phone]    When to call or come in to the hospital  If you notice a decrease in your baby's movement.   If your begin to experience contractions that are 5 minutes or less apart and lasting for over 45 seconds, or if contractions are becoming more painful.  If you have any bleeding or leakage of fluids.   If you have a headache not resolved with tylenol, right upper abdominal pain, or sudden onset of swelling.  You know your body best. Never hesitate to call or go to the hospital if something doesn't feel right!    Preparing for the hospital  You re likely feeling anxious as your child s birth approaches. This is normal. To give yourself some peace of mind, pack a bag 3-4 weeks before your due date. Here is a list of things to remember:  Personal care items like toothbrush, hair brush, lip balm, lotion, shampoo, glasses, contacts  Nightgown, bathrobe, slippers  Several pairs of underwear  Comfortable clothes for you to wear home  Camera with new batteries  Cell phone and   Insurance information and any other paperwork needed for your hospital stay  Clothes for baby to wear home  An infant, rear-facing car seat for bringing home your baby (this is required by law)

## 2024-05-22 ENCOUNTER — HOSPITAL ENCOUNTER (OUTPATIENT)
Dept: ULTRASOUND IMAGING | Facility: HOSPITAL | Age: 29
Discharge: HOME | End: 2024-05-22
Attending: FAMILY MEDICINE | Admitting: FAMILY MEDICINE
Payer: COMMERCIAL

## 2024-05-22 DIAGNOSIS — O26.843 UTERINE SIZE-DATE DISCREPANCY IN THIRD TRIMESTER: ICD-10-CM

## 2024-05-22 PROCEDURE — 76816 OB US FOLLOW-UP PER FETUS: CPT

## 2024-05-28 ENCOUNTER — PRENATAL OFFICE VISIT (OUTPATIENT)
Dept: FAMILY MEDICINE | Facility: CLINIC | Age: 29
End: 2024-05-28
Payer: COMMERCIAL

## 2024-05-28 VITALS
WEIGHT: 182 LBS | RESPIRATION RATE: 16 BRPM | DIASTOLIC BLOOD PRESSURE: 60 MMHG | OXYGEN SATURATION: 100 % | BODY MASS INDEX: 26.96 KG/M2 | HEIGHT: 69 IN | HEART RATE: 76 BPM | SYSTOLIC BLOOD PRESSURE: 103 MMHG

## 2024-05-28 DIAGNOSIS — Z34.83 ENCOUNTER FOR SUPERVISION OF OTHER NORMAL PREGNANCY IN THIRD TRIMESTER: Primary | ICD-10-CM

## 2024-05-28 DIAGNOSIS — B95.1 POSITIVE GBS TEST: ICD-10-CM

## 2024-05-28 PROCEDURE — 59426 ANTEPARTUM CARE ONLY: CPT | Performed by: FAMILY MEDICINE

## 2024-05-28 NOTE — PROGRESS NOTES
"Ansley Rosario is a 29 year old  female who presents to clinic for a follow up OB visit. She is currently 40w2d with an estimated date of delivery of May 26, 2024 via 8-week ultrasound. She denies headache, chest pain, shortness of breath, abdominal pain, vaginal bleeding, or abnormal discharge. She continues to feel baby move.     New concerns today: none    OB History    Para Term  AB Living   2 1 1 0 0 1   SAB IAB Ectopic Multiple Live Births   0 0 0 0 1      # Outcome Date GA Lbr Khari/2nd Weight Sex Type Anes PTL Lv   2 Current            1 Term 22 39w3d / 01:10 2.92 kg (6 lb 7 oz) M Vag-Spont EPI N NETTA      Name: VALMALE-ANSLEY      Apgar1: 9  Apgar5: 9       Physical exam:  /60   Pulse 76   Resp 16   Ht 1.753 m (5' 9\")   Wt 82.6 kg (182 lb)   LMP 2023   SpO2 100%   BMI 26.88 kg/m      General: alert, female in no acute distress  Abdomen: gravid uterus appropriate for gestation age at 38 cm above pubic symphysis, non-tender, FHTs 140, vertex on bedside US  GYN: Posterior cervix, unable to palpate  Extremities: no edema    Prenatal labs  Blood type: A+  Hgb: 14.4-->13  Pap: nml 3/3/22  Hep B, HIV, syphilis, gonorrhea, chlamydia, HIV negative  Rubella immune  UA/Ucx normal  NIPS nml, boy! Desires circumcision  Passed 1 hr GTT  TDAP 3/12/2024, influenza 23, declined COVID  Prefers nonpharmacologic options for pain control during delivery. No morphine. May consider nitrous.   Plans to breastfeed  GBS positive    Assessment/Plan:  1. Encounter for supervision of other normal pregnancy in third trimester  Discussed labor signs and symptoms and when to call/come in.  Scheduled for induction for postdates 2024.    2. Positive GBS test  Plan for antibiotics during labor.         Follow up in 1 week for routine prenatal care.    Isabel Beckham, DO       "

## 2024-06-02 ENCOUNTER — ANESTHESIA (OUTPATIENT)
Dept: OBGYN | Facility: HOSPITAL | Age: 29
End: 2024-06-02
Payer: COMMERCIAL

## 2024-06-02 ENCOUNTER — ANESTHESIA EVENT (OUTPATIENT)
Dept: OBGYN | Facility: HOSPITAL | Age: 29
End: 2024-06-02
Payer: COMMERCIAL

## 2024-06-02 ENCOUNTER — HOSPITAL ENCOUNTER (INPATIENT)
Facility: HOSPITAL | Age: 29
LOS: 2 days | Discharge: HOME | End: 2024-06-04
Attending: FAMILY MEDICINE | Admitting: FAMILY MEDICINE
Payer: COMMERCIAL

## 2024-06-02 PROBLEM — Z36.89 ENCOUNTER FOR TRIAGE IN PREGNANT PATIENT: Status: ACTIVE | Noted: 2024-06-02

## 2024-06-02 LAB
ABO/RH(D): NORMAL
ANTIBODY SCREEN: NEGATIVE
HGB BLD-MCNC: 12.8 G/DL (ref 11.7–15.7)
SPECIMEN EXPIRATION DATE: NORMAL

## 2024-06-02 PROCEDURE — 250N000011 HC RX IP 250 OP 636: Performed by: FAMILY MEDICINE

## 2024-06-02 PROCEDURE — 3E033VJ INTRODUCTION OF OTHER HORMONE INTO PERIPHERAL VEIN, PERCUTANEOUS APPROACH: ICD-10-PCS | Performed by: FAMILY MEDICINE

## 2024-06-02 PROCEDURE — 370N000003 HC ANESTHESIA WARD SERVICE: Performed by: ANESTHESIOLOGY

## 2024-06-02 PROCEDURE — 120N000001 HC R&B MED SURG/OB

## 2024-06-02 PROCEDURE — 36415 COLL VENOUS BLD VENIPUNCTURE: CPT | Performed by: FAMILY MEDICINE

## 2024-06-02 PROCEDURE — 85018 HEMOGLOBIN: CPT | Performed by: FAMILY MEDICINE

## 2024-06-02 PROCEDURE — 00HU33Z INSERTION OF INFUSION DEVICE INTO SPINAL CANAL, PERCUTANEOUS APPROACH: ICD-10-PCS | Performed by: ANESTHESIOLOGY

## 2024-06-02 PROCEDURE — 250N000011 HC RX IP 250 OP 636: Performed by: ANESTHESIOLOGY

## 2024-06-02 PROCEDURE — 86900 BLOOD TYPING SEROLOGIC ABO: CPT | Performed by: FAMILY MEDICINE

## 2024-06-02 PROCEDURE — 250N000009 HC RX 250: Performed by: FAMILY MEDICINE

## 2024-06-02 PROCEDURE — 3E0R3BZ INTRODUCTION OF ANESTHETIC AGENT INTO SPINAL CANAL, PERCUTANEOUS APPROACH: ICD-10-PCS | Performed by: ANESTHESIOLOGY

## 2024-06-02 PROCEDURE — 258N000003 HC RX IP 258 OP 636: Performed by: FAMILY MEDICINE

## 2024-06-02 RX ORDER — PROCHLORPERAZINE 25 MG
25 SUPPOSITORY, RECTAL RECTAL EVERY 12 HOURS PRN
Status: DISCONTINUED | OUTPATIENT
Start: 2024-06-02 | End: 2024-06-03 | Stop reason: HOSPADM

## 2024-06-02 RX ORDER — ONDANSETRON 4 MG/1
4 TABLET, ORALLY DISINTEGRATING ORAL EVERY 6 HOURS PRN
Status: DISCONTINUED | OUTPATIENT
Start: 2024-06-02 | End: 2024-06-03 | Stop reason: HOSPADM

## 2024-06-02 RX ORDER — KETOROLAC TROMETHAMINE 30 MG/ML
30 INJECTION, SOLUTION INTRAMUSCULAR; INTRAVENOUS
Status: COMPLETED | OUTPATIENT
Start: 2024-06-02 | End: 2024-06-03

## 2024-06-02 RX ORDER — FENTANYL CITRATE 50 UG/ML
50 INJECTION, SOLUTION INTRAMUSCULAR; INTRAVENOUS EVERY 30 MIN PRN
Status: DISCONTINUED | OUTPATIENT
Start: 2024-06-02 | End: 2024-06-03 | Stop reason: HOSPADM

## 2024-06-02 RX ORDER — PENICILLIN G 3000000 [IU]/50ML
3 INJECTION, SOLUTION INTRAVENOUS EVERY 4 HOURS
Status: DISCONTINUED | OUTPATIENT
Start: 2024-06-02 | End: 2024-06-03 | Stop reason: HOSPADM

## 2024-06-02 RX ORDER — CARBOPROST TROMETHAMINE 250 UG/ML
250 INJECTION, SOLUTION INTRAMUSCULAR
Status: DISCONTINUED | OUTPATIENT
Start: 2024-06-02 | End: 2024-06-03 | Stop reason: HOSPADM

## 2024-06-02 RX ORDER — ONDANSETRON 2 MG/ML
4 INJECTION INTRAMUSCULAR; INTRAVENOUS EVERY 6 HOURS PRN
Status: DISCONTINUED | OUTPATIENT
Start: 2024-06-02 | End: 2024-06-03 | Stop reason: HOSPADM

## 2024-06-02 RX ORDER — NALOXONE HYDROCHLORIDE 0.4 MG/ML
0.4 INJECTION, SOLUTION INTRAMUSCULAR; INTRAVENOUS; SUBCUTANEOUS
Status: DISCONTINUED | OUTPATIENT
Start: 2024-06-02 | End: 2024-06-03 | Stop reason: HOSPADM

## 2024-06-02 RX ORDER — IBUPROFEN 800 MG/1
800 TABLET, FILM COATED ORAL
Status: COMPLETED | OUTPATIENT
Start: 2024-06-02 | End: 2024-06-03

## 2024-06-02 RX ORDER — FENTANYL/ROPIVACAINE/NS/PF 2MCG/ML-.1
PLASTIC BAG, INJECTION (ML) EPIDURAL
Status: DISCONTINUED | OUTPATIENT
Start: 2024-06-02 | End: 2024-06-03 | Stop reason: HOSPADM

## 2024-06-02 RX ORDER — LIDOCAINE 40 MG/G
CREAM TOPICAL
Status: DISCONTINUED | OUTPATIENT
Start: 2024-06-02 | End: 2024-06-02 | Stop reason: HOSPADM

## 2024-06-02 RX ORDER — PENICILLIN G POTASSIUM 5000000 [IU]/1
5 INJECTION, POWDER, FOR SOLUTION INTRAMUSCULAR; INTRAVENOUS ONCE
Status: COMPLETED | OUTPATIENT
Start: 2024-06-02 | End: 2024-06-02

## 2024-06-02 RX ORDER — HYDROXYZINE HYDROCHLORIDE 50 MG/1
50 TABLET, FILM COATED ORAL
Status: DISCONTINUED | OUTPATIENT
Start: 2024-06-02 | End: 2024-06-04 | Stop reason: HOSPADM

## 2024-06-02 RX ORDER — LOPERAMIDE HCL 2 MG
4 CAPSULE ORAL
Status: DISCONTINUED | OUTPATIENT
Start: 2024-06-02 | End: 2024-06-03 | Stop reason: HOSPADM

## 2024-06-02 RX ORDER — PROCHLORPERAZINE MALEATE 10 MG
10 TABLET ORAL EVERY 6 HOURS PRN
Status: DISCONTINUED | OUTPATIENT
Start: 2024-06-02 | End: 2024-06-03 | Stop reason: HOSPADM

## 2024-06-02 RX ORDER — NALOXONE HYDROCHLORIDE 0.4 MG/ML
0.2 INJECTION, SOLUTION INTRAMUSCULAR; INTRAVENOUS; SUBCUTANEOUS
Status: DISCONTINUED | OUTPATIENT
Start: 2024-06-02 | End: 2024-06-03 | Stop reason: HOSPADM

## 2024-06-02 RX ORDER — MISOPROSTOL 200 UG/1
800 TABLET ORAL
Status: DISCONTINUED | OUTPATIENT
Start: 2024-06-02 | End: 2024-06-03 | Stop reason: HOSPADM

## 2024-06-02 RX ORDER — CITRIC ACID/SODIUM CITRATE 334-500MG
30 SOLUTION, ORAL ORAL
Status: DISCONTINUED | OUTPATIENT
Start: 2024-06-02 | End: 2024-06-03 | Stop reason: HOSPADM

## 2024-06-02 RX ORDER — METOCLOPRAMIDE HYDROCHLORIDE 5 MG/ML
10 INJECTION INTRAMUSCULAR; INTRAVENOUS EVERY 6 HOURS PRN
Status: DISCONTINUED | OUTPATIENT
Start: 2024-06-02 | End: 2024-06-03 | Stop reason: HOSPADM

## 2024-06-02 RX ORDER — OXYTOCIN 10 [USP'U]/ML
10 INJECTION, SOLUTION INTRAMUSCULAR; INTRAVENOUS
Status: DISCONTINUED | OUTPATIENT
Start: 2024-06-02 | End: 2024-06-03 | Stop reason: HOSPADM

## 2024-06-02 RX ORDER — OXYTOCIN 10 [USP'U]/ML
10 INJECTION, SOLUTION INTRAMUSCULAR; INTRAVENOUS
Status: DISCONTINUED | OUTPATIENT
Start: 2024-06-02 | End: 2024-06-04 | Stop reason: HOSPADM

## 2024-06-02 RX ORDER — METOCLOPRAMIDE 10 MG/1
10 TABLET ORAL EVERY 6 HOURS PRN
Status: DISCONTINUED | OUTPATIENT
Start: 2024-06-02 | End: 2024-06-03 | Stop reason: HOSPADM

## 2024-06-02 RX ORDER — LOPERAMIDE HCL 2 MG
2 CAPSULE ORAL
Status: DISCONTINUED | OUTPATIENT
Start: 2024-06-02 | End: 2024-06-03 | Stop reason: HOSPADM

## 2024-06-02 RX ORDER — ACETAMINOPHEN 325 MG/1
650 TABLET ORAL EVERY 4 HOURS PRN
Status: DISCONTINUED | OUTPATIENT
Start: 2024-06-02 | End: 2024-06-03 | Stop reason: HOSPADM

## 2024-06-02 RX ORDER — OXYTOCIN/0.9 % SODIUM CHLORIDE 30/500 ML
340 PLASTIC BAG, INJECTION (ML) INTRAVENOUS CONTINUOUS PRN
Status: DISCONTINUED | OUTPATIENT
Start: 2024-06-02 | End: 2024-06-03 | Stop reason: HOSPADM

## 2024-06-02 RX ORDER — METHYLERGONOVINE MALEATE 0.2 MG/ML
200 INJECTION INTRAVENOUS
Status: DISCONTINUED | OUTPATIENT
Start: 2024-06-02 | End: 2024-06-03 | Stop reason: HOSPADM

## 2024-06-02 RX ORDER — NALBUPHINE HYDROCHLORIDE 20 MG/ML
2.5-5 INJECTION, SOLUTION INTRAMUSCULAR; INTRAVENOUS; SUBCUTANEOUS EVERY 6 HOURS PRN
Status: DISCONTINUED | OUTPATIENT
Start: 2024-06-02 | End: 2024-06-04 | Stop reason: HOSPADM

## 2024-06-02 RX ORDER — MISOPROSTOL 200 UG/1
400 TABLET ORAL
Status: DISCONTINUED | OUTPATIENT
Start: 2024-06-02 | End: 2024-06-03 | Stop reason: HOSPADM

## 2024-06-02 RX ORDER — TRANEXAMIC ACID 10 MG/ML
1 INJECTION, SOLUTION INTRAVENOUS EVERY 30 MIN PRN
Status: DISCONTINUED | OUTPATIENT
Start: 2024-06-02 | End: 2024-06-03 | Stop reason: HOSPADM

## 2024-06-02 RX ORDER — SODIUM CHLORIDE, SODIUM LACTATE, POTASSIUM CHLORIDE, CALCIUM CHLORIDE 600; 310; 30; 20 MG/100ML; MG/100ML; MG/100ML; MG/100ML
INJECTION, SOLUTION INTRAVENOUS CONTINUOUS PRN
Status: DISCONTINUED | OUTPATIENT
Start: 2024-06-02 | End: 2024-06-03 | Stop reason: HOSPADM

## 2024-06-02 RX ORDER — OXYTOCIN/0.9 % SODIUM CHLORIDE 30/500 ML
100-340 PLASTIC BAG, INJECTION (ML) INTRAVENOUS CONTINUOUS PRN
Status: DISCONTINUED | OUTPATIENT
Start: 2024-06-02 | End: 2024-06-04 | Stop reason: HOSPADM

## 2024-06-02 RX ORDER — OXYTOCIN/0.9 % SODIUM CHLORIDE 30/500 ML
1-24 PLASTIC BAG, INJECTION (ML) INTRAVENOUS CONTINUOUS
Status: DISCONTINUED | OUTPATIENT
Start: 2024-06-02 | End: 2024-06-03 | Stop reason: HOSPADM

## 2024-06-02 RX ORDER — FENTANYL CITRATE-0.9 % NACL/PF 10 MCG/ML
100 PLASTIC BAG, INJECTION (ML) INTRAVENOUS EVERY 5 MIN PRN
Status: DISCONTINUED | OUTPATIENT
Start: 2024-06-02 | End: 2024-06-03 | Stop reason: HOSPADM

## 2024-06-02 RX ORDER — LIDOCAINE 40 MG/G
CREAM TOPICAL
Status: DISCONTINUED | OUTPATIENT
Start: 2024-06-02 | End: 2024-06-03 | Stop reason: HOSPADM

## 2024-06-02 RX ADMIN — PENICILLIN G POTASSIUM 5 MILLION UNITS: 5000000 POWDER, FOR SOLUTION INTRAMUSCULAR; INTRAPLEURAL; INTRATHECAL; INTRAVENOUS at 18:41

## 2024-06-02 RX ADMIN — Medication 2 MILLI-UNITS/MIN: at 17:31

## 2024-06-02 RX ADMIN — PENICILLIN G 3 MILLION UNITS: 3000000 INJECTION, SOLUTION INTRAVENOUS at 22:13

## 2024-06-02 RX ADMIN — SODIUM CHLORIDE, POTASSIUM CHLORIDE, SODIUM LACTATE AND CALCIUM CHLORIDE 25 ML/HR: 600; 310; 30; 20 INJECTION, SOLUTION INTRAVENOUS at 17:27

## 2024-06-02 RX ADMIN — Medication: at 21:51

## 2024-06-02 ASSESSMENT — ACTIVITIES OF DAILY LIVING (ADL)
ADLS_ACUITY_SCORE: 20
ADLS_ACUITY_SCORE: 22
ADLS_ACUITY_SCORE: 20
ADLS_ACUITY_SCORE: 22
ADLS_ACUITY_SCORE: 35
ADLS_ACUITY_SCORE: 20

## 2024-06-02 NOTE — PROGRESS NOTES
Data: [facility] presented to [facility]: 2024  4:03 PM.  Patient admitted for induction for postdates. Patient is a .  Prenatal record reviewed. Pregnancy has been uncomplicated..  Gestational Age 41w0d. VSS. Fetal movement present. Patient denies leaking of vaginal fluid/rupture of membranes, headache, visual disturbances. Support person is present.   Action: Verbal consent for EFM.  Admission assessment completed. Bill of rights reviewed. SVE on admission is 2/70/-2 - Bishops score of 6.  Dr. Isabel Beckham conacted with update and further orders. Orders received for pitocin augmentation of labor.  Response: Patient verbalized agreement with plan.   Annie Davidson RN  2024 6:22 PM

## 2024-06-02 NOTE — PLAN OF CARE
Problem: Labor  Goal: Acceptable Pain Control  Intervention: Support Labor Pain Coping and Management  Flowsheets (Taken 6/2/2024 1309)  Complementary Therapy: music therapy provided   Goal Outcome Evaluation:             Patient expresses desire to labor without epidural.  Encouraged frequent position changes and reviewed use of Nitrous per patient request

## 2024-06-02 NOTE — H&P
"OB Admission H&P  Date: 2024  Time: 5:42 PM  Ansley Rosario,  1995, MRN [id number]    CC: IOL    HPI: Ansley Rosario is a 29 year old year old  at 41w0d weeks by 8 wk US presenting for IOL 2/2 postdates.  She denies leakage of fluid and vaginal bleeding and reports good fetal movement.  Mild cramping Thursday, resolved by Friday.  Today started having more consistent contractions approximately every 7 to 10 minutes.    Prenatal Complications: None    OB/Gyn History  OB History    Para Term  AB Living   2 1 1 0 0 1   SAB IAB Ectopic Multiple Live Births   0 0 0 0 1      # Outcome Date GA Lbr Khari/2nd Weight Sex Type Anes PTL Lv   2 Current            1 Term 22 39w3d / 01:10 2.92 kg (6 lb 7 oz) M Vag-Spont EPI N NETTA      Name: VAL,MALE-ANSLEY      Apgar1: 9  Apgar5: 9       Physical Exam:  /72 (BP Location: Left arm, Patient Position: Semi-Gillis's, Cuff Size: Adult Regular)   Pulse 77   Temp 98.1  F (36.7  C) (Oral)   Resp 18   Ht 1.753 m (5' 9\")   Wt 82.6 kg (182 lb)   LMP 2023   BMI 26.88 kg/m      Gen: no acute distress, resting comfortably   CV: acyanotic   Pulm: unlabored respirations   Abd: gravid, soft, nontender   Extremities: soft, nontender    Cervical Exam: 2/70/-2 per nursing exam  FHR: Applying Aye device currently  [place]:  q7min    Prenatal labs  Blood type: A+  Hgb: 14.4-->13  Pap: nml 3/3/22  Hep B, HIV, syphilis, gonorrhea, chlamydia, HIV negative  Rubella immune  UA/Ucx normal  NIPS nml, boy! Desires circumcision  Passed 1 hr GTT  TDAP 3/12/2024, influenza 23, declined COVID  Prefers nonpharmacologic options for pain control during delivery. No morphine. May consider nitrous.   Plans to breastfeed  GBS positive    Impression:  Ansley Rosario is a 29 year old year old  at 41w0d weeks admitted for IOL secondary to postdates, otherwise uncomplicated pregnancy. Fetal status reassuring.      Plan:   Admit to L&D                 "                                     GBS positive, initiate antibiotics when in active labor or if SROM occurs  Begin low-dose Pitocin   Considering nitrous for pain control if needed, epidural okay if she desires  Anticipate     Isabel Beckham DO  2024, 5:42 PM

## 2024-06-03 PROCEDURE — 250N000013 HC RX MED GY IP 250 OP 250 PS 637: Performed by: FAMILY MEDICINE

## 2024-06-03 PROCEDURE — 10907ZC DRAINAGE OF AMNIOTIC FLUID, THERAPEUTIC FROM PRODUCTS OF CONCEPTION, VIA NATURAL OR ARTIFICIAL OPENING: ICD-10-PCS | Performed by: FAMILY MEDICINE

## 2024-06-03 PROCEDURE — 120N000001 HC R&B MED SURG/OB

## 2024-06-03 PROCEDURE — 722N000001 HC LABOR CARE VAGINAL DELIVERY SINGLE

## 2024-06-03 PROCEDURE — 250N000011 HC RX IP 250 OP 636: Performed by: FAMILY MEDICINE

## 2024-06-03 PROCEDURE — 0HQ9XZZ REPAIR PERINEUM SKIN, EXTERNAL APPROACH: ICD-10-PCS | Performed by: FAMILY MEDICINE

## 2024-06-03 PROCEDURE — 59410 OBSTETRICAL CARE: CPT | Performed by: FAMILY MEDICINE

## 2024-06-03 RX ORDER — ACETAMINOPHEN 325 MG/1
650 TABLET ORAL EVERY 4 HOURS PRN
Status: DISCONTINUED | OUTPATIENT
Start: 2024-06-03 | End: 2024-06-04 | Stop reason: HOSPADM

## 2024-06-03 RX ORDER — CARBOPROST TROMETHAMINE 250 UG/ML
250 INJECTION, SOLUTION INTRAMUSCULAR
Status: DISCONTINUED | OUTPATIENT
Start: 2024-06-03 | End: 2024-06-04 | Stop reason: HOSPADM

## 2024-06-03 RX ORDER — DOCUSATE SODIUM 100 MG/1
100 CAPSULE, LIQUID FILLED ORAL DAILY
Status: DISCONTINUED | OUTPATIENT
Start: 2024-06-03 | End: 2024-06-04 | Stop reason: HOSPADM

## 2024-06-03 RX ORDER — OXYTOCIN/0.9 % SODIUM CHLORIDE 30/500 ML
340 PLASTIC BAG, INJECTION (ML) INTRAVENOUS CONTINUOUS PRN
Status: DISCONTINUED | OUTPATIENT
Start: 2024-06-03 | End: 2024-06-04 | Stop reason: HOSPADM

## 2024-06-03 RX ORDER — OXYTOCIN 10 [USP'U]/ML
10 INJECTION, SOLUTION INTRAMUSCULAR; INTRAVENOUS
Status: DISCONTINUED | OUTPATIENT
Start: 2024-06-03 | End: 2024-06-04 | Stop reason: HOSPADM

## 2024-06-03 RX ORDER — TRANEXAMIC ACID 10 MG/ML
1 INJECTION, SOLUTION INTRAVENOUS EVERY 30 MIN PRN
Status: DISCONTINUED | OUTPATIENT
Start: 2024-06-03 | End: 2024-06-04 | Stop reason: HOSPADM

## 2024-06-03 RX ORDER — HYDROCORTISONE 25 MG/G
CREAM TOPICAL 3 TIMES DAILY PRN
Status: DISCONTINUED | OUTPATIENT
Start: 2024-06-03 | End: 2024-06-04 | Stop reason: HOSPADM

## 2024-06-03 RX ORDER — IBUPROFEN 800 MG/1
800 TABLET, FILM COATED ORAL EVERY 6 HOURS PRN
Status: DISCONTINUED | OUTPATIENT
Start: 2024-06-03 | End: 2024-06-04 | Stop reason: HOSPADM

## 2024-06-03 RX ORDER — LOPERAMIDE HCL 2 MG
4 CAPSULE ORAL
Status: DISCONTINUED | OUTPATIENT
Start: 2024-06-03 | End: 2024-06-04 | Stop reason: HOSPADM

## 2024-06-03 RX ORDER — MISOPROSTOL 200 UG/1
800 TABLET ORAL
Status: DISCONTINUED | OUTPATIENT
Start: 2024-06-03 | End: 2024-06-04 | Stop reason: HOSPADM

## 2024-06-03 RX ORDER — MISOPROSTOL 200 UG/1
400 TABLET ORAL
Status: DISCONTINUED | OUTPATIENT
Start: 2024-06-03 | End: 2024-06-04 | Stop reason: HOSPADM

## 2024-06-03 RX ORDER — LOPERAMIDE HCL 2 MG
2 CAPSULE ORAL
Status: DISCONTINUED | OUTPATIENT
Start: 2024-06-03 | End: 2024-06-04 | Stop reason: HOSPADM

## 2024-06-03 RX ORDER — MODIFIED LANOLIN
OINTMENT (GRAM) TOPICAL
Status: DISCONTINUED | OUTPATIENT
Start: 2024-06-03 | End: 2024-06-04 | Stop reason: HOSPADM

## 2024-06-03 RX ORDER — METHYLERGONOVINE MALEATE 0.2 MG/ML
200 INJECTION INTRAVENOUS
Status: DISCONTINUED | OUTPATIENT
Start: 2024-06-03 | End: 2024-06-04 | Stop reason: HOSPADM

## 2024-06-03 RX ORDER — BISACODYL 10 MG
10 SUPPOSITORY, RECTAL RECTAL DAILY PRN
Status: DISCONTINUED | OUTPATIENT
Start: 2024-06-03 | End: 2024-06-04 | Stop reason: HOSPADM

## 2024-06-03 RX ADMIN — DOCUSATE SODIUM 100 MG: 100 CAPSULE, LIQUID FILLED ORAL at 08:24

## 2024-06-03 RX ADMIN — KETOROLAC TROMETHAMINE 30 MG: 30 INJECTION, SOLUTION INTRAMUSCULAR at 00:32

## 2024-06-03 RX ADMIN — ACETAMINOPHEN 650 MG: 325 TABLET, FILM COATED ORAL at 12:18

## 2024-06-03 RX ADMIN — ACETAMINOPHEN 650 MG: 325 TABLET, FILM COATED ORAL at 05:11

## 2024-06-03 RX ADMIN — IBUPROFEN 800 MG: 800 TABLET ORAL at 20:07

## 2024-06-03 ASSESSMENT — ACTIVITIES OF DAILY LIVING (ADL)
ADLS_ACUITY_SCORE: 20

## 2024-06-03 NOTE — ANESTHESIA POSTPROCEDURE EVALUATION
Patient: Melissa Rosario    Procedure: * No procedures listed *       Anesthesia Type:  Epidural    Note:  Disposition: Inpatient   Postop Pain Control: Uneventful            Sign Out: Well controlled pain   PONV: No   Neuro/Psych: Uneventful            Sign Out: Acceptable/Baseline neuro status   Airway/Respiratory: Uneventful            Sign Out: Acceptable/Baseline resp. status   CV/Hemodynamics: Uneventful            Sign Out: Acceptable CV status; No obvious hypovolemia; No obvious fluid overload   Other NRE: NONE   DID A NON-ROUTINE EVENT OCCUR? No           Last vitals:  Vitals:    06/03/24 0145 06/03/24 0200 06/03/24 0500   BP: 118/65 129/61 131/73   Pulse:   72   Resp:   16   Temp:   37.1  C (98.7  F)   SpO2: 95%  96%       Electronically Signed By: Tr Alejo MD  Klarissa 3, 2024  6:10 AM

## 2024-06-03 NOTE — PLAN OF CARE
Problem: Labor  Goal: Acceptable Pain Control  Outcome: Met     Problem: Labor  Goal: Effective Progression to Delivery  Outcome: Met   Goal Outcome Evaluation:         Patient received an epidural at approx 2200 and had no complaints of pain thereafter. Patient went on to complete at approx 2330 and delivered a viable male infant at 0008 on 06/03/24. Patients uterus remains firm without massage, bleeding is scant with no clots, and is offering no complaints of pain. Patient is due to void.     Nancy Zazueta RN 1:17 AM 06/03/24

## 2024-06-03 NOTE — DISCHARGE SUMMARY
[facility]    Discharge Summary  Obstetrics    Date of Admission:  2024  Date of Discharge:  2024  Discharging Provider: Isabel Beckham DO    Discharge Diagnoses       History of Present Illness   Melissa Rosario is a 29 year old female for induction of labor secondary to postdates s/p .  GBS positive, adequate antibiotic prophylaxis.    Hospital Course   The patient's hospital course was unremarkable.  She recovered as anticipated and experienced no post-delivery complications. On discharge, her pain was well controlled. Vaginal bleeding is similar to peak menstrual flow.  Voiding without difficulty.  Ambulating well and tolerating a normal diet.  No fevers.  Breastfeeding well.  Infant is stable.  She was discharged on post-partum day #1 per patient request.    Post-partum hemoglobin:   Hemoglobin   Date Value Ref Range Status   2024 12.8 11.7 - 15.7 g/dL Final       [place] Depression Scale  Thoughts of Harming Self: Never  Total Score: 0      DO Wally Cleary Disposition   Discharged to home   Condition at discharge: Stable    Primary Care Physician   Isabel Beckham    Consultations This Hospital Stay   LACTATION IP CONSULT    Discharge Orders      Activity    Activity as tolerated     Reason for your hospital stay    Maternity care     Follow Up    Follow up with provider in 2 weeks and 6 weeks for post-delivery checks     Postpartum Exercises for vaginal delivery    These exercises may be started soon after delivery. If you feel tired or uncomfortable, stop and try these exercises after resting. Check for any separation in your stomach wall before doing exercises that involve twisting or stress on your stomach muscles. To check this place your fingers in the center of your upper abdomen and lift your head and shoulders up in a partial sit-up. If you feel a separation in your muscle wall, it is too soon to do sit ups.   1) Tighten and relax your  pelvic floor muscles often.   2) Breathe deeply while laying on your back. As you breathe out, lift just your head up and pull the sides of your stomach toward the middle with your hands. Then breathe in as you put your head down. This will help to close the separation of your stomach.   3) Tilt your pelvis back and forth. Alternate this with full body stretches.   4) Move your feet back and forth, then in circular motions.   5) While lying on your back, slide your heels toward your hips and bend your knees one at a time, then together.   6) While lying flat on the floor, bend your knees and raise your legs one at a time.   7) When the stomach wall separation has closed, you can progress to straight curl-ups, diagonal curl-ups, and side leg lifts.     Breast pump - Manual/Electric    Breast Pump Documentation:  Manual/Electric Pump: To support adequate breast milk production and nutrition for infant.     I, the undersigned, certify that the above prescribed supplies are medically necessary for this patient and is both reasonable and necessary in reference to accepted standards of medical and necessary in reference to accepted standards of medical practice in the treatment of this patient's condition and is not prescribed as a convenience.     Diet    Resume previous diet     Discharge Medications   Current Discharge Medication List        CONTINUE these medications which have NOT CHANGED    Details   prenatal vit no.130-iron-folic (PRENATAL VITAMIN) 27 mg iron- 800 mcg Tab tablet [PRENATAL VIT NO.130-IRON-FOLIC (PRENATAL VITAMIN) 27 MG IRON- 800 MCG TAB TABLET] Take 1 tablet by mouth daily.  Qty: 90 tablet, Refills: 3    Associated Diagnoses: Normal first pregnancy confirmed, currently in first trimester           Allergies   No Known Allergies

## 2024-06-03 NOTE — PROGRESS NOTES
At 0145 fundal check patients fundus was to the right of her umbilicus. Patient voided 200mL and post void fundus check was midline and firm without massage.     Nancy Zazueta RN 2:08 AM 06/03/24

## 2024-06-03 NOTE — PLAN OF CARE
Goal Outcome Evaluation:    Postpartum assessment is within normal limits. Pain relieved with Tylenol. [place]  depression scale discussed. Birth certificate discussed.  Shaken baby video discussed.  Patient desires to go home after 24 hour screenings for infant. Attentive to infant and independent with cares.     Apolonia Armenta RN on 6/3/2024 at 3:00 PM

## 2024-06-03 NOTE — ANESTHESIA PROCEDURE NOTES
"Epidural catheter Procedure Note    Pre-Procedure   Staff -        Anesthesiologist:  Tr Alejo MD       Performed By: anesthesiologist       Location: OB       Procedure Start/Stop Times: 6/2/2024 9:49 PM and 6/2/2024 10:06 PM       Pre-Anesthestic Checklist: patient identified, IV checked, risks and benefits discussed, informed consent, monitors and equipment checked, pre-op evaluation and at physician/surgeon's request  Timeout:       Correct Patient: Yes        Correct Procedure: Yes        Correct Site: Yes        Correct Position: Yes   Procedure Documentation  Procedure: epidural catheter       Patient Position: sitting       Patient Prep/Sterile Barriers: sterile gloves, mask, patient draped       Skin prep: Chloraprep       Local skin infiltrated with mL of 1% lidocaine.        Insertion Site: L1-2. (midline approach).       Technique: LORT air        PETTY at 5 cm.       Needle Type: [place] Pellets       Needle Gauge: 18.        Needle Length (Inches): 3.5        Catheter: 20 G.          Catheter threaded easily.             # of attempts: 1 and  # of redirects:  0    Assessment/Narrative         Paresthesias: No.       Test dose of 3 mL lidocaine 1.5% w/ 1:200,000 epinephrine at 22:04 CDT.        .       Insertion/Infusion Method: LORT air       Aspiration negative for Heme or CSF via Epidural Catheter.    Medication(s) Administered   Medication Administration Time: 6/2/2024 9:49 PM      FOR [facility] ONLY:   Pain Team Contact information: please page the Pain Team Via [employer]. Search \"Pain\". During daytime hours, please page the attending first. At night please page the resident first.      "

## 2024-06-03 NOTE — L&D DELIVERY NOTE
OB Vaginal Delivery Note    Melissa Rosario MRN# [id number]   Age: 29 year old YOB: 1995       GA: 41w1d  GP:   Labor Complications: None   EBL: 100  mL  Delivery QBL: 100 mL  Delivery Type: Vaginal, Spontaneous   ROM to Delivery Time: (Delivered) Minutes: 11   Weight:     1 Minute 5 Minute 10 Minute   Apgar Totals:            CAIN RIGGS;ROMAN HARRISON;AVELINO LANDON     Delivery Details:  Melissa Rosario, a 29 year old  female delivered a viable infant with apgars of   and  . Patient was fully dilated and pushing after   hours   minutes in active labor. Delivery was via vaginal, spontaneous  to a sterile field under epidural  anesthesia. Infant delivered in vertex  right  occiput  anterior  position. Anterior and posterior shoulders delivered without difficulty. The cord was clamped, cut twice and 3 vessels  were noted. Cord blood was obtained in routine fashion with the following disposition: discard .      Cord complications: none   Placenta delivered at 6/3/2024 12:13 AM . Placental disposition was Hospital disposal . Fundal massage performed and fundus found to be firm.     Episiotomy: none    Perineum, vagina, cervix were inspected, and the following lacerations were noted:   Perineal lacerations: 1st  repaired with one simple interrupted suture using 3-0 vicryl     Excellent hemostasis was noted. Needle count correct. Infant and patient in delivery room in good and stable condition.        Kalyan RosarioElaineMelissa [[id number]]      Labor Event Times      Dilation complete date: 24 Complete time: 11:30 PM          Labor Events     labor?: No   steroids: None  Labor Type: Augmentation  Predominate monitoring during 1st stage: continuous electronic fetal monitoring     Antibiotics received during labor?: Yes  Reason for Antibiotics: GBS  Antibiotics received for GBS: Penicillin  Antibiotics Given (GBS): Greater than 4 hours prior to delivery       Rupture  date/time: 24 2356   Rupture type: Artificial Rupture of Membranes  Fluid color: Meconium     Induction: Oxytocin  Induction date/time:      Cervical ripening date/time:      Indications for induction: Post-term Gestation     Augmentation: Oxytocin  Indications for augmentation: Ineffective Contraction Pattern       Delivery/Placenta Date and Time      Delivery Date: 6/3/24 Delivery Time: 12:08 AM   Placenta Date/Time: 6/3/2024 12:13 AM  Oxytocin given at the time of delivery: after delivery of baby  Delivering clinician: Isabel Beckham DO   Other personnel present at delivery:  Provider Role   Nancy Holman RN Bergh, Jamie, RN Hennen, Shannon T, JUDY              Vaginal Counts       Initial count performed by 2 team members:  Two Team Members   Dr. Chapincito holman RN         Needles Suture Needles Sponges (RETIRED) Instruments   Initial counts 0 0 5    Added to count  1     Relief counts       Final counts 0 1 5            Placed during labor Accounted for at the end of labor   FSE NA NA   IUPC NA NA   Cervidil NA NA                  Final count performed by 2 team members:  Two Team Members   Dr. Chapincito Holman RN      Final count correct?: Yes  Pre-Birth Team Brief: Complete  Post-Birth Team Debrief: Complete       Apgars    Living status: Living   1 Minute 5 Minute 10 Minute 15 Minute 20 Minute   Skin color:        Heart rate:        Reflex irritability:        Muscle tone:        Respiratory effort:        Total:               Cord      Vessels: 3 Vessels    Cord Complications: None               Cord Blood Disposition: Discard    Gases Sent?: No    Delayed cord clamping?: Yes    Cord Clamping Delay (seconds):  seconds            Resuscitation    Methods: None       Labor Events and Shoulder Dystocia    Fetal Tracing Prior to Delivery: Category 2  Shoulder dystocia present?: Neg       Delivery (Maternal) (Provider to Complete) (119704)    Episiotomy: None  Perineal  lacerations: 1st    Est. blood loss (mL): 100  Repair suture: 3-0 Vicryl       Blood Loss  Mother: Melissa Rosario #[id number]     Start of Mother's Information      Delivery Blood Loss  06/02/24 1208 - 06/03/24 0028      EBL (mL) Hospital Encounter 100 mL    Delivery QBL (mL) Hospital Encounter 100 mL    Total  200 mL               End of Mother's Information  Mother: Melissa Rosario #[id number]                Delivery - Provider to Complete ([id number])    Delivering clinician: Isabel Beckham DO  Delivery Type (Choose the 1 that will go to the Birth History): Vaginal, Spontaneous                         Other personnel:  Provider Role   Nancy Zazueta RN Bergh, Jamie, RN Hennen, Linda YOUNG, JUDY                     Placenta    Date/Time: 6/3/2024 12:13 AM  Removal: Spontaneous  Disposition: Hospital disposal             Anesthesia    Method: Epidural  Cervical dilation at placement: 4-7                    Presentation and Position    Presentation: Vertex    Position: Right Occiput Anterior                     Isabel Beckham DO

## 2024-06-03 NOTE — ANESTHESIA PREPROCEDURE EVALUATION
"Anesthesia Pre-Procedure Evaluation    Patient: Melissa Rosario   MRN: [id number] : 1995        Procedure :           History reviewed. No pertinent past medical history.   Past Surgical History:   Procedure Laterality Date    wisdom teeth        No Known Allergies   Social History     Tobacco Use    Smoking status: Never    Smokeless tobacco: Never   Substance Use Topics    Alcohol use: No     Alcohol/week: 0.0 standard drinks of alcohol      Wt Readings from Last 1 Encounters:   24 82.6 kg (182 lb)        Anesthesia Evaluation   Pt has had prior anesthetic.         ROS/MED HX  ENT/Pulmonary:  - neg pulmonary ROS     Neurologic:  - neg neurologic ROS     Cardiovascular:  - neg cardiovascular ROS     METS/Exercise Tolerance:     Hematologic:  - neg hematologic  ROS     Musculoskeletal:  - neg musculoskeletal ROS     GI/Hepatic:  - neg GI/hepatic ROS     Renal/Genitourinary:  - neg Renal ROS     Endo:  - neg endo ROS     Psychiatric/Substance Use:  - neg psychiatric ROS     Infectious Disease:  - neg infectious disease ROS     Malignancy:  - neg malignancy ROS     Other:      (+) Possibly pregnant, , ,         Physical Exam    Airway  airway exam normal           Respiratory Devices and Support         Dental       (+) Completely normal teeth      Cardiovascular   cardiovascular exam normal          Pulmonary   pulmonary exam normal                OUTSIDE LABS:  CBC:   Lab Results   Component Value Date    WBC 10.0 10/17/2023    WBC 8.0 2021    HGB 12.8 2024    HGB 13.0 2024    HCT 41.6 10/17/2023    HCT 38.9 2021     10/17/2023     2021     BMP: No results found for: \"NA\", \"POTASSIUM\", \"CHLORIDE\", \"CO2\", \"BUN\", \"CR\", \"GLC\"  COAGS: No results found for: \"PTT\", \"INR\", \"FIBR\"  POC:   Lab Results   Component Value Date    HCG Positive (A) 2021     HEPATIC: No results found for: \"ALBUMIN\", \"PROTTOTAL\", \"ALT\", \"AST\", \"GGT\", \"ALKPHOS\", \"BILITOTAL\", " "\"BILIDIRECT\", \"AIDE\"  OTHER: No results found for: \"PH\", \"LACT\", \"A1C\", \"YUDELKA\", \"PHOS\", \"MAG\", \"LIPASE\", \"AMYLASE\", \"TSH\", \"T4\", \"T3\", \"CRP\", \"SED\"    Anesthesia Plan    ASA Status:  2       Anesthesia Type: Epidural.              Consents    Anesthesia Plan(s) and associated risks, benefits, and realistic alternatives discussed. Questions answered and patient/representative(s) expressed understanding.     - Discussed:     - Discussed with:  Patient, Spouse      - Extended Intubation/Ventilatory Support Discussed: No.      - Patient is DNR/DNI Status: No     Use of blood products discussed: No .     Postoperative Care    Pain management: IV analgesics.   PONV prophylaxis: Ondansetron (or other 5HT-3)     Comments:               Tr Alejo MD    I have reviewed the pertinent notes and labs in the chart from the past 30 days and (re)examined the patient.  Any updates or changes from those notes are reflected in this note.                  "

## 2024-06-04 VITALS
WEIGHT: 172.9 LBS | OXYGEN SATURATION: 96 % | BODY MASS INDEX: 25.61 KG/M2 | HEIGHT: 69 IN | HEART RATE: 69 BPM | SYSTOLIC BLOOD PRESSURE: 119 MMHG | TEMPERATURE: 98 F | DIASTOLIC BLOOD PRESSURE: 62 MMHG | RESPIRATION RATE: 18 BRPM

## 2024-06-04 PROCEDURE — 250N000013 HC RX MED GY IP 250 OP 250 PS 637: Performed by: FAMILY MEDICINE

## 2024-06-04 RX ADMIN — IBUPROFEN 800 MG: 800 TABLET ORAL at 07:36

## 2024-06-04 RX ADMIN — DOCUSATE SODIUM 100 MG: 100 CAPSULE, LIQUID FILLED ORAL at 07:36

## 2024-06-04 ASSESSMENT — ACTIVITIES OF DAILY LIVING (ADL)
ADLS_ACUITY_SCORE: 20

## 2024-06-04 NOTE — PROGRESS NOTES
Patient meets criteria for discharge, has been seen by provider and orders received. Patient discharged home with baby and spouse

## 2024-06-04 NOTE — PROVIDER NOTIFICATION
Charge called to update provider that baby was unable to discharge overnight by the residents, as the attending has to approve in the morning.       MD updated that patient will be staying until the morning and MD stated she would round in early AM

## 2024-06-04 NOTE — PLAN OF CARE
Data: Vital signs within normal limits. Postpartum checks within normal limits - see flow record. Patient eating and drinking normally. Patient able to empty bladder independently and is up ambulating. No apparent signs of infection.  Laceration  healing well. Patient performing self cares and is able to care for infant.Mom Breastfeeding independently exclusively well.     Action: Patient declined medication during the shift for pain. Patient stated she was comfortable. Patient education done about pain, post partum care.     Response: Positive attachment behaviors observed with infant. Support persons  present.      Plan: Patient will like 24 hour discharge as soon as possible.  Goal Outcome Evaluation: Stable Post partum recovery.    Patient is meeting discharge goals.      Plan of Care Reviewed With: patient, spouse    Overall Patient Progress: improvingOverall Patient Progress: improving

## 2024-06-04 NOTE — PLAN OF CARE
"Goal Outcome Evaluation: Progressing      Plan of Care Reviewed With: patient    Overall Patient Progress: improvingOverall Patient Progress: improving    Patient's VSS. Fundus is firm at umbilicus with scant lochia. Patient is up ad catarina and performing self-cares independently. Reporting uterine cramping pain being treated with ibuprofen and rest. Patient's  is in the room with her and infant, attentive/supportive and participating in cares.    PPMA score of 0, still working on the infant's birth certificate at this moment.    /60 (BP Location: Left arm, Patient Position: Supine, Cuff Size: Adult Regular)   Pulse 70   Temp 98.5  F (36.9  C) (Oral)   Resp 18   Ht 1.753 m (5' 9\")   Wt 82.6 kg (182 lb)   LMP 08/20/2023   SpO2 96%   Breastfeeding Unknown   BMI 26.88 kg/m      PINKY DIOP RN on 6/3/2024 at 9:13 PM      Problem: Adult Inpatient Plan of Care  Goal: Optimal Comfort and Wellbeing  Outcome: Progressing             "

## 2024-06-05 ENCOUNTER — MYC MEDICAL ADVICE (OUTPATIENT)
Dept: FAMILY MEDICINE | Facility: CLINIC | Age: 29
End: 2024-06-05
Payer: COMMERCIAL

## 2024-06-05 NOTE — TELEPHONE ENCOUNTER
Called patient and left message to schedule a circ for Pickard. When patient returns call please help schedule a circ for son ok to use a 40 minute help/ reserved spot with Dr. Beckham.

## 2024-07-07 ENCOUNTER — HEALTH MAINTENANCE LETTER (OUTPATIENT)
Age: 29
End: 2024-07-07

## 2024-07-24 ENCOUNTER — PRENATAL OFFICE VISIT (OUTPATIENT)
Dept: FAMILY MEDICINE | Facility: CLINIC | Age: 29
End: 2024-07-24
Payer: COMMERCIAL

## 2024-07-24 VITALS
BODY MASS INDEX: 23.91 KG/M2 | SYSTOLIC BLOOD PRESSURE: 111 MMHG | DIASTOLIC BLOOD PRESSURE: 58 MMHG | WEIGHT: 161.4 LBS | RESPIRATION RATE: 16 BRPM | OXYGEN SATURATION: 100 % | HEIGHT: 69 IN | HEART RATE: 76 BPM

## 2024-07-24 PROCEDURE — 99207 PR POST PARTUM EXAM: CPT | Performed by: FAMILY MEDICINE

## 2024-07-24 ASSESSMENT — EDINBURGH POSTNATAL DEPRESSION SCALE (EPDS)
THINGS HAVE BEEN GETTING ON TOP OF ME: NO, I HAVE BEEN COPING AS WELL AS EVER
I HAVE BEEN SO UNHAPPY THAT I HAVE HAD DIFFICULTY SLEEPING: NOT AT ALL
THE THOUGHT OF HARMING MYSELF HAS OCCURRED TO ME: NEVER
I HAVE BLAMED MYSELF UNNECESSARILY WHEN THINGS WENT WRONG: NO, NEVER
I HAVE FELT SCARED OR PANICKY FOR NO GOOD REASON: NO, NOT AT ALL
TOTAL SCORE: 0
I HAVE LOOKED FORWARD WITH ENJOYMENT TO THINGS: AS MUCH AS I EVER DID
I HAVE BEEN ANXIOUS OR WORRIED FOR NO GOOD REASON: NO, NOT AT ALL
I HAVE BEEN SO UNHAPPY THAT I HAVE BEEN CRYING: NO, NEVER
I HAVE BEEN ABLE TO LAUGH AND SEE THE FUNNY SIDE OF THINGS: AS MUCH AS I ALWAYS COULD
I HAVE FELT SAD OR MISERABLE: NO, NOT AT ALL

## 2024-07-24 NOTE — PROGRESS NOTES
Ansley Rosario is a 29 year old  female presenting for routine postpartum follow up.    Date of delivery was 2024 via .  Complications noted during the pregnancy/delivery include none.    Healing well, pain has subsided  Mood: denies concerns with postpartum blues/depression  Energy: good, no significant fatigue   Has not resumed sexual activity   No return of period yet and no worrisome bleeding  Breast feeding, working on latch  Still taking prenatal vitamin     Other concerns today include none.    OB History    Para Term  AB Living   2 2 2 0 0 2   SAB IAB Ectopic Multiple Live Births   0 0 0 0 2      # Outcome Date GA Lbr Khari/2nd Weight Sex Type Anes PTL Lv   2 Term 24 41w1d / 00:38 3.5 kg (7 lb 11.5 oz) M Vag-Spont EPI N NETTA      Name: Neeraj Rosario      Apgar1: 8  Apgar5: 9   1 Term 22 39w3d / 01:10 2.92 kg (6 lb 7 oz) M Vag-Spont EPI N NETTA      Name: VALMALE-ANSLEY      Apgar1: 9  Apgar5: 9       LMP 2023    General: no apparent distress, appears well  Cardiovascular: regular rate and rhythm without murmur  Pulmonary: clear to auscultation bilaterally without wheezing or crackles  Abdomen: Soft, non-tender.   Lower extremity: no significant swelling    Assessment/Plan  1. Routine Post Partum Care: Overall doing well and adjusting to new baby. Post partum depression has not been a concern.  Working on breast-feeding.  Recommended continuing prenatal vitamin during breastfeeding.   planning vasectomy for contraception. Ok to resume normal activities without restriction.    Isabel Beckham DO

## 2024-08-05 ENCOUNTER — MEDICAL CORRESPONDENCE (OUTPATIENT)
Dept: HEALTH INFORMATION MANAGEMENT | Facility: CLINIC | Age: 29
End: 2024-08-05
Payer: COMMERCIAL

## 2024-10-17 ENCOUNTER — TRANSFERRED RECORDS (OUTPATIENT)
Dept: HEALTH INFORMATION MANAGEMENT | Facility: CLINIC | Age: 29
End: 2024-10-17
Payer: COMMERCIAL

## 2025-07-13 ENCOUNTER — HEALTH MAINTENANCE LETTER (OUTPATIENT)
Age: 30
End: 2025-07-13

## 2025-08-26 ENCOUNTER — LAB (OUTPATIENT)
Dept: LAB | Facility: CLINIC | Age: 30
End: 2025-08-26
Payer: COMMERCIAL

## 2025-08-26 DIAGNOSIS — Z77.21 EXPOSURE TO BLOOD OR BODY FLUID: ICD-10-CM

## 2025-08-26 DIAGNOSIS — Z77.21 EXPOSURE TO BLOOD OR BODY FLUID: Primary | ICD-10-CM

## 2025-08-26 LAB
HBV SURFACE AG SERPL QL IA: NONREACTIVE
HIV 1+2 AB+HIV1 P24 AG SERPL QL IA: NONREACTIVE
HIV 1+2 AB+HIV1P24 AG SERPLBLD IA.RAPID: NON REACTIVE
HIV 1+2 AB+HIV1P24 AG SERPLBLD IA.RAPID: NON REACTIVE
HIV INTERPRETATION: NORMAL

## 2025-08-27 LAB — HCV RNA SERPL NAA+PROBE-ACNC: NOT DETECTED IU/ML
